# Patient Record
Sex: MALE | Race: WHITE | NOT HISPANIC OR LATINO | URBAN - NONMETROPOLITAN AREA
[De-identification: names, ages, dates, MRNs, and addresses within clinical notes are randomized per-mention and may not be internally consistent; named-entity substitution may affect disease eponyms.]

---

## 2022-05-03 ENCOUNTER — OFFICE VISIT (OUTPATIENT)
Dept: UROLOGY | Facility: CLINIC | Age: 42
End: 2022-05-03

## 2022-05-03 VITALS — WEIGHT: 185 LBS | HEIGHT: 72 IN | TEMPERATURE: 98.3 F | BODY MASS INDEX: 25.06 KG/M2

## 2022-05-03 DIAGNOSIS — Z30.2 ENCOUNTER FOR VASECTOMY: Primary | ICD-10-CM

## 2022-05-03 PROCEDURE — 99203 OFFICE O/P NEW LOW 30 MIN: CPT | Performed by: UROLOGY

## 2022-05-03 RX ORDER — HYDROCODONE BITARTRATE AND ACETAMINOPHEN 5; 325 MG/1; MG/1
1 TABLET ORAL EVERY 8 HOURS PRN
Qty: 9 TABLET | Refills: 0 | Status: SHIPPED | OUTPATIENT
Start: 2022-05-03 | End: 2022-05-06

## 2022-05-03 RX ORDER — ALPRAZOLAM 2 MG/1
2 TABLET ORAL ONCE
Qty: 1 TABLET | Refills: 0 | Status: SHIPPED | OUTPATIENT
Start: 2022-05-03 | End: 2022-05-03

## 2022-05-03 RX ORDER — DEXTROAMPHETAMINE SACCHARATE, AMPHETAMINE ASPARTATE, DEXTROAMPHETAMINE SULFATE AND AMPHETAMINE SULFATE 2.5; 2.5; 2.5; 2.5 MG/1; MG/1; MG/1; MG/1
10 TABLET ORAL DAILY
COMMUNITY
End: 2022-11-23

## 2022-05-03 RX ORDER — ALPRAZOLAM 2 MG/1
2 TABLET ORAL ONCE
Qty: 1 TABLET | Refills: 0 | Status: CANCELLED | OUTPATIENT
Start: 2022-05-03 | End: 2022-05-03

## 2022-06-17 ENCOUNTER — PROCEDURE VISIT (OUTPATIENT)
Dept: UROLOGY | Facility: CLINIC | Age: 42
End: 2022-06-17

## 2022-06-17 DIAGNOSIS — Z30.2 ENCOUNTER FOR VASECTOMY: Primary | ICD-10-CM

## 2022-06-17 PROCEDURE — 55250 REMOVAL OF SPERM DUCT(S): CPT | Performed by: UROLOGY

## 2022-06-17 NOTE — PROGRESS NOTES
CC: I am here to have a vasectomy    Vasectomy procedure note  Patient has been premedicated with alprazolam and Norco.  He has done the appropriate shave the day before the procedure.  He is placed in the supine position.  The usual prep and drape with Betadine is carried out.  The vas is palpated on the right side and  from the remainder of the cord bluntly and pulled just underneath the skin.  1% lidocaine is infiltrated in the subcutaneous tissue.  An incision is made directly onto the vas.  The perivasal tissue was bluntly stripped away from the vas deferens freeing an approximate 2 cm segment.  Titanium clips were placed both proximally and distally and the intervening segment excised.  The specimen,is discarded.  The ends are fulgurated with electrocautery as well as any vessels.    The left-sided vasectomy was carried out the same as the right with no change in findings or technique.  Both wounds are irrigated with sterile saline.  Any subcutaneous vessels that are bleeding are fulgurated.  The skin is closed with a running 3-0 chromic suture.    The patient tolerated this procedure well.  Postoperative instructions were given.  He is reminded that we will need to see  a sample after approximately 20-25 ejaculations to determine whether or not he has had a successful vasectomy.  He is encouraged to use birth control up until that time.    Angel Ceballos MD  6/17/2022  10:46 CDT

## 2022-08-23 ENCOUNTER — APPOINTMENT (OUTPATIENT)
Dept: LAB | Facility: HOSPITAL | Age: 42
End: 2022-08-23

## 2022-09-23 ENCOUNTER — LAB (OUTPATIENT)
Dept: LAB | Facility: HOSPITAL | Age: 42
End: 2022-09-23

## 2022-09-23 DIAGNOSIS — Z30.2 ENCOUNTER FOR VASECTOMY: ICD-10-CM

## 2022-09-23 LAB — SPERM - POST VASECTOMY: NORMAL

## 2022-09-23 PROCEDURE — 89321 SEMEN ANAL SPERM DETECTION: CPT

## 2022-10-03 ENCOUNTER — TELEPHONE (OUTPATIENT)
Dept: UROLOGY | Facility: CLINIC | Age: 42
End: 2022-10-03

## 2022-10-03 NOTE — TELEPHONE ENCOUNTER
Called patient and left a message for him to call back to go over semen analysis. Left call back number. His results were successful and he can drop his back up birthcontrol.

## 2022-10-03 NOTE — TELEPHONE ENCOUNTER
----- Message from Angel Ceballos MD sent at 10/3/2022  7:08 AM CDT -----  No sperm seen on post vasectomy semen analysis at the lab. . Will have staff notify patient that the vasectomy was successful.

## 2022-10-06 ENCOUNTER — TELEPHONE (OUTPATIENT)
Dept: UROLOGY | Facility: CLINIC | Age: 42
End: 2022-10-06

## 2022-10-06 NOTE — TELEPHONE ENCOUNTER
HUB [facility]LE TO WARM TRANSFER TO OFFICE.     PT RETURNING KAYLENE MISSED CALL REGARDING HIS SEMEN ANALYSIS. PH NUMBER [phone]

## 2022-11-23 ENCOUNTER — HOSPITAL ENCOUNTER (INPATIENT)
Facility: HOSPITAL | Age: 42
LOS: 8 days | Discharge: HOME | End: 2022-12-01
Attending: EMERGENCY MEDICINE | Admitting: INTERNAL MEDICINE

## 2022-11-23 ENCOUNTER — APPOINTMENT (OUTPATIENT)
Dept: CT IMAGING | Facility: HOSPITAL | Age: 42
End: 2022-11-23

## 2022-11-23 ENCOUNTER — APPOINTMENT (OUTPATIENT)
Dept: GENERAL RADIOLOGY | Facility: HOSPITAL | Age: 42
End: 2022-11-23

## 2022-11-23 DIAGNOSIS — R65.10 SYSTEMIC INFLAMMATORY RESPONSE SYNDROME: ICD-10-CM

## 2022-11-23 DIAGNOSIS — K52.9 ENTEROCOLITIS: ICD-10-CM

## 2022-11-23 DIAGNOSIS — R10.9 ABDOMINAL PAIN, UNSPECIFIED ABDOMINAL LOCATION: Primary | ICD-10-CM

## 2022-11-23 DIAGNOSIS — R31.9 HEMATURIA, UNSPECIFIED TYPE: ICD-10-CM

## 2022-11-23 DIAGNOSIS — R18.8 OTHER ASCITES: ICD-10-CM

## 2022-11-23 DIAGNOSIS — I96 NECROSIS: ICD-10-CM

## 2022-11-23 DIAGNOSIS — R94.31 ABNORMAL EKG: ICD-10-CM

## 2022-11-23 DIAGNOSIS — E10.10 DIABETIC KETOACIDOSIS WITHOUT COMA ASSOCIATED WITH TYPE 1 DIABETES MELLITUS: ICD-10-CM

## 2022-11-23 DIAGNOSIS — Z78.9 DECREASED ACTIVITIES OF DAILY LIVING (ADL): ICD-10-CM

## 2022-11-23 DIAGNOSIS — K56.609 SMALL BOWEL OBSTRUCTION: ICD-10-CM

## 2022-11-23 DIAGNOSIS — Z74.09 IMPAIRED MOBILITY: ICD-10-CM

## 2022-11-23 DIAGNOSIS — E87.20 LACTIC ACIDOSIS: ICD-10-CM

## 2022-11-23 PROBLEM — I95.9 SEPSIS ASSOCIATED HYPOTENSION: Status: ACTIVE | Noted: 2022-11-23

## 2022-11-23 PROBLEM — R73.9 HYPERGLYCEMIA: Status: ACTIVE | Noted: 2022-11-23

## 2022-11-23 PROBLEM — N17.9 AKI (ACUTE KIDNEY INJURY): Status: ACTIVE | Noted: 2022-11-23

## 2022-11-23 PROBLEM — A41.9 SEPSIS ASSOCIATED HYPOTENSION: Status: ACTIVE | Noted: 2022-11-23

## 2022-11-23 LAB
ABO GROUP BLD: NORMAL
ALBUMIN SERPL-MCNC: 4.8 G/DL (ref 3.5–5.2)
ALBUMIN SERPL-MCNC: 4.8 G/DL (ref 3.5–5.2)
ALBUMIN/GLOB SERPL: 1.8 G/DL
ALBUMIN/GLOB SERPL: 2 G/DL
ALP SERPL-CCNC: 55 U/L (ref 39–117)
ALP SERPL-CCNC: 58 U/L (ref 39–117)
ALT SERPL W P-5'-P-CCNC: 23 U/L (ref 1–41)
ALT SERPL W P-5'-P-CCNC: 24 U/L (ref 1–41)
AMPHET+METHAMPHET UR QL: POSITIVE
AMPHETAMINES UR QL: NEGATIVE
ANION GAP SERPL CALCULATED.3IONS-SCNC: 13 MMOL/L (ref 5–15)
ANION GAP SERPL CALCULATED.3IONS-SCNC: 21 MMOL/L (ref 5–15)
ARTERIAL PATENCY WRIST A: ABNORMAL
AST SERPL-CCNC: 12 U/L (ref 1–40)
AST SERPL-CCNC: 14 U/L (ref 1–40)
ATMOSPHERIC PRESS: 754 MMHG
BACTERIA UR QL AUTO: ABNORMAL /HPF
BARBITURATES UR QL SCN: NEGATIVE
BASE EXCESS BLDA CALC-SCNC: -6.6 MMOL/L (ref 0–2)
BASOPHILS # BLD AUTO: 0.02 10*3/MM3 (ref 0–0.2)
BASOPHILS # BLD AUTO: 0.06 10*3/MM3 (ref 0–0.2)
BASOPHILS NFR BLD AUTO: 0.1 % (ref 0–1.5)
BASOPHILS NFR BLD AUTO: 0.3 % (ref 0–1.5)
BDY SITE: ABNORMAL
BENZODIAZ UR QL SCN: NEGATIVE
BILIRUB SERPL-MCNC: 0.7 MG/DL (ref 0–1.2)
BILIRUB SERPL-MCNC: 0.8 MG/DL (ref 0–1.2)
BILIRUB UR QL STRIP: NEGATIVE
BLD GP AB SCN SERPL QL: NEGATIVE
BODY TEMPERATURE: 37 C
BUN SERPL-MCNC: 30 MG/DL (ref 6–20)
BUN SERPL-MCNC: 34 MG/DL (ref 6–20)
BUN/CREAT SERPL: 23.3 (ref 7–25)
BUN/CREAT SERPL: 36.6 (ref 7–25)
BUPRENORPHINE SERPL-MCNC: NEGATIVE NG/ML
C TRACH RRNA CVX QL NAA+PROBE: NOT DETECTED
CALCIUM SPEC-SCNC: 9.1 MG/DL (ref 8.6–10.5)
CALCIUM SPEC-SCNC: 9.2 MG/DL (ref 8.6–10.5)
CANNABINOIDS SERPL QL: NEGATIVE
CHLORIDE SERPL-SCNC: 102 MMOL/L (ref 98–107)
CHLORIDE SERPL-SCNC: 99 MMOL/L (ref 98–107)
CLARITY UR: CLEAR
CO2 SERPL-SCNC: 24 MMOL/L (ref 22–29)
CO2 SERPL-SCNC: 27 MMOL/L (ref 22–29)
COCAINE UR QL: NEGATIVE
COLOR UR: ABNORMAL
CREAT SERPL-MCNC: 0.82 MG/DL (ref 0.76–1.27)
CREAT SERPL-MCNC: 1.46 MG/DL (ref 0.76–1.27)
CRP SERPL-MCNC: <0.3 MG/DL (ref 0–0.5)
D-LACTATE SERPL-SCNC: 11.3 MMOL/L (ref 0.5–2)
D-LACTATE SERPL-SCNC: 3.1 MMOL/L (ref 0.5–2)
D-LACTATE SERPL-SCNC: 5.3 MMOL/L (ref 0.5–2)
D-LACTATE SERPL-SCNC: 8.2 MMOL/L (ref 0.5–2)
DEPRECATED RDW RBC AUTO: 43.5 FL (ref 37–54)
DEPRECATED RDW RBC AUTO: 46.8 FL (ref 37–54)
DEVELOPER EXPIRATION DATE: ABNORMAL
DEVELOPER LOT NUMBER: 325
EGFRCR SERPLBLD CKD-EPI 2021: 111.8 ML/MIN/1.73
EGFRCR SERPLBLD CKD-EPI 2021: 60.8 ML/MIN/1.73
EOSINOPHIL # BLD AUTO: 0 10*3/MM3 (ref 0–0.4)
EOSINOPHIL # BLD AUTO: 0.3 10*3/MM3 (ref 0–0.4)
EOSINOPHIL NFR BLD AUTO: 0 % (ref 0.3–6.2)
EOSINOPHIL NFR BLD AUTO: 1.3 % (ref 0.3–6.2)
ERYTHROCYTE [DISTWIDTH] IN BLOOD BY AUTOMATED COUNT: 12.7 % (ref 12.3–15.4)
ERYTHROCYTE [DISTWIDTH] IN BLOOD BY AUTOMATED COUNT: 13.6 % (ref 12.3–15.4)
ETHANOL UR QL: <0.01 %
EXPIRATION DATE: ABNORMAL
GAS FLOW AIRWAY: 2 LPM
GASTROCULT GAST QL: POSITIVE
GLOBULIN UR ELPH-MCNC: 2.4 GM/DL
GLOBULIN UR ELPH-MCNC: 2.7 GM/DL
GLUCOSE SERPL-MCNC: 203 MG/DL (ref 65–99)
GLUCOSE SERPL-MCNC: 290 MG/DL (ref 65–99)
GLUCOSE UR STRIP-MCNC: NEGATIVE MG/DL
HCO3 BLDA-SCNC: 18.9 MMOL/L (ref 20–26)
HCT VFR BLD AUTO: 54.6 % (ref 37.5–51)
HCT VFR BLD AUTO: 65.9 % (ref 37.5–51)
HGB BLD-MCNC: 18.1 G/DL (ref 13–17.7)
HGB BLD-MCNC: 21.2 G/DL (ref 13–17.7)
HGB UR QL STRIP.AUTO: ABNORMAL
HOLD SPECIMEN: NORMAL
HYALINE CASTS UR QL AUTO: ABNORMAL /LPF
IMM GRANULOCYTES # BLD AUTO: 0.05 10*3/MM3 (ref 0–0.05)
IMM GRANULOCYTES # BLD AUTO: 0.12 10*3/MM3 (ref 0–0.05)
IMM GRANULOCYTES NFR BLD AUTO: 0.3 % (ref 0–0.5)
IMM GRANULOCYTES NFR BLD AUTO: 0.5 % (ref 0–0.5)
INR PPP: 1.15 (ref 0.91–1.09)
KETONES UR QL STRIP: ABNORMAL
LEUKOCYTE ESTERASE UR QL STRIP.AUTO: NEGATIVE
LIPASE SERPL-CCNC: 28 U/L (ref 13–60)
LYMPHOCYTES # BLD AUTO: 0.51 10*3/MM3 (ref 0.7–3.1)
LYMPHOCYTES # BLD AUTO: 0.59 10*3/MM3 (ref 0.7–3.1)
LYMPHOCYTES NFR BLD AUTO: 2.6 % (ref 19.6–45.3)
LYMPHOCYTES NFR BLD AUTO: 3.4 % (ref 19.6–45.3)
Lab: 325
Lab: ABNORMAL
MCH RBC QN AUTO: 30.8 PG (ref 26.6–33)
MCH RBC QN AUTO: 31.1 PG (ref 26.6–33)
MCHC RBC AUTO-ENTMCNC: 32.2 G/DL (ref 31.5–35.7)
MCHC RBC AUTO-ENTMCNC: 33.2 G/DL (ref 31.5–35.7)
MCV RBC AUTO: 92.9 FL (ref 79–97)
MCV RBC AUTO: 96.8 FL (ref 79–97)
METHADONE UR QL SCN: NEGATIVE
MODALITY: ABNORMAL
MONOCYTES # BLD AUTO: 0.36 10*3/MM3 (ref 0.1–0.9)
MONOCYTES # BLD AUTO: 0.96 10*3/MM3 (ref 0.1–0.9)
MONOCYTES NFR BLD AUTO: 2.4 % (ref 5–12)
MONOCYTES NFR BLD AUTO: 4.2 % (ref 5–12)
N GONORRHOEA RRNA SPEC QL NAA+PROBE: NOT DETECTED
NEGATIVE CONTROL: NEGATIVE
NEUTROPHILS NFR BLD AUTO: 14.13 10*3/MM3 (ref 1.7–7)
NEUTROPHILS NFR BLD AUTO: 20.57 10*3/MM3 (ref 1.7–7)
NEUTROPHILS NFR BLD AUTO: 91.1 % (ref 42.7–76)
NEUTROPHILS NFR BLD AUTO: 93.8 % (ref 42.7–76)
NITRITE UR QL STRIP: NEGATIVE
NRBC BLD AUTO-RTO: 0 /100 WBC (ref 0–0.2)
NRBC BLD AUTO-RTO: 0 /100 WBC (ref 0–0.2)
OPIATES UR QL: NEGATIVE
OXYCODONE UR QL SCN: NEGATIVE
PCO2 BLDA: 37.7 MM HG (ref 35–45)
PCO2 TEMP ADJ BLD: 37.7 MM HG (ref 35–45)
PCP UR QL SCN: NEGATIVE
PH BLDA: 7.31 PH UNITS (ref 7.35–7.45)
PH UR STRIP.AUTO: 5.5 [PH] (ref 5–8)
PH, TEMP CORRECTED: 7.31 PH UNITS (ref 7.35–7.45)
PLATELET # BLD AUTO: 357 10*3/MM3 (ref 140–450)
PLATELET # BLD AUTO: 399 10*3/MM3 (ref 140–450)
PMV BLD AUTO: 9.1 FL (ref 6–12)
PMV BLD AUTO: 9.2 FL (ref 6–12)
PO2 BLDA: 97.3 MM HG (ref 83–108)
PO2 TEMP ADJ BLD: 97.3 MM HG (ref 83–108)
POSITIVE CONTROL: POSITIVE
POTASSIUM SERPL-SCNC: 4 MMOL/L (ref 3.5–5.2)
POTASSIUM SERPL-SCNC: 4.1 MMOL/L (ref 3.5–5.2)
PROCALCITONIN SERPL-MCNC: 0.02 NG/ML (ref 0–0.25)
PROPOXYPH UR QL: NEGATIVE
PROT SERPL-MCNC: 7.2 G/DL (ref 6–8.5)
PROT SERPL-MCNC: 7.5 G/DL (ref 6–8.5)
PROT UR QL STRIP: ABNORMAL
PROTHROMBIN TIME: 14.2 SECONDS (ref 11.9–14.6)
RBC # BLD AUTO: 5.88 10*6/MM3 (ref 4.14–5.8)
RBC # BLD AUTO: 6.81 10*6/MM3 (ref 4.14–5.8)
RBC # UR STRIP: ABNORMAL /HPF
REF LAB TEST METHOD: ABNORMAL
RH BLD: POSITIVE
SAO2 % BLDCOA: 97.8 % (ref 94–99)
SODIUM SERPL-SCNC: 142 MMOL/L (ref 136–145)
SODIUM SERPL-SCNC: 144 MMOL/L (ref 136–145)
SP GR UR STRIP: >1.03 (ref 1–1.03)
SQUAMOUS #/AREA URNS HPF: ABNORMAL /HPF
T&S EXPIRATION DATE: NORMAL
TRICYCLICS UR QL SCN: NEGATIVE
UROBILINOGEN UR QL STRIP: ABNORMAL
VENTILATOR MODE: ABNORMAL
WBC # UR STRIP: ABNORMAL /HPF
WBC NRBC COR # BLD: 15.07 10*3/MM3 (ref 3.4–10.8)
WBC NRBC COR # BLD: 22.6 10*3/MM3 (ref 3.4–10.8)

## 2022-11-23 PROCEDURE — 84145 PROCALCITONIN (PCT): CPT | Performed by: EMERGENCY MEDICINE

## 2022-11-23 PROCEDURE — 36415 COLL VENOUS BLD VENIPUNCTURE: CPT | Performed by: EMERGENCY MEDICINE

## 2022-11-23 PROCEDURE — 87040 BLOOD CULTURE FOR BACTERIA: CPT | Performed by: EMERGENCY MEDICINE

## 2022-11-23 PROCEDURE — 74176 CT ABD & PELVIS W/O CONTRAST: CPT

## 2022-11-23 PROCEDURE — 87591 N.GONORRHOEAE DNA AMP PROB: CPT | Performed by: EMERGENCY MEDICINE

## 2022-11-23 PROCEDURE — 99285 EMERGENCY DEPT VISIT HI MDM: CPT

## 2022-11-23 PROCEDURE — 25010000002 PIPERACILLIN SOD-TAZOBACTAM PER 1 G: Performed by: EMERGENCY MEDICINE

## 2022-11-23 PROCEDURE — 82803 BLOOD GASES ANY COMBINATION: CPT

## 2022-11-23 PROCEDURE — 99222 1ST HOSP IP/OBS MODERATE 55: CPT | Performed by: SPECIALIST

## 2022-11-23 PROCEDURE — 83690 ASSAY OF LIPASE: CPT | Performed by: EMERGENCY MEDICINE

## 2022-11-23 PROCEDURE — 25010000002 ENOXAPARIN PER 10 MG: Performed by: SPECIALIST

## 2022-11-23 PROCEDURE — 0 HYDROMORPHONE 1 MG/ML SOLUTION: Performed by: EMERGENCY MEDICINE

## 2022-11-23 PROCEDURE — 25010000002 ONDANSETRON PER 1 MG: Performed by: EMERGENCY MEDICINE

## 2022-11-23 PROCEDURE — 25010000002 PIPERACILLIN SOD-TAZOBACTAM PER 1 G: Performed by: NURSE PRACTITIONER

## 2022-11-23 PROCEDURE — 80053 COMPREHEN METABOLIC PANEL: CPT | Performed by: EMERGENCY MEDICINE

## 2022-11-23 PROCEDURE — 71045 X-RAY EXAM CHEST 1 VIEW: CPT

## 2022-11-23 PROCEDURE — 86900 BLOOD TYPING SEROLOGIC ABO: CPT | Performed by: EMERGENCY MEDICINE

## 2022-11-23 PROCEDURE — 25010000002 KETOROLAC TROMETHAMINE PER 15 MG: Performed by: EMERGENCY MEDICINE

## 2022-11-23 PROCEDURE — 25010000002 HYDROMORPHONE PER 4 MG: Performed by: EMERGENCY MEDICINE

## 2022-11-23 PROCEDURE — 36415 COLL VENOUS BLD VENIPUNCTURE: CPT

## 2022-11-23 PROCEDURE — 25010000002 HYDROMORPHONE PER 4 MG: Performed by: NURSE PRACTITIONER

## 2022-11-23 PROCEDURE — 80306 DRUG TEST PRSMV INSTRMNT: CPT | Performed by: FAMILY MEDICINE

## 2022-11-23 PROCEDURE — 93005 ELECTROCARDIOGRAM TRACING: CPT | Performed by: EMERGENCY MEDICINE

## 2022-11-23 PROCEDURE — 86850 RBC ANTIBODY SCREEN: CPT | Performed by: EMERGENCY MEDICINE

## 2022-11-23 PROCEDURE — 93010 ELECTROCARDIOGRAM REPORT: CPT | Performed by: INTERNAL MEDICINE

## 2022-11-23 PROCEDURE — 85610 PROTHROMBIN TIME: CPT | Performed by: EMERGENCY MEDICINE

## 2022-11-23 PROCEDURE — 83605 ASSAY OF LACTIC ACID: CPT | Performed by: EMERGENCY MEDICINE

## 2022-11-23 PROCEDURE — 86140 C-REACTIVE PROTEIN: CPT | Performed by: EMERGENCY MEDICINE

## 2022-11-23 PROCEDURE — 3E03329 INTRODUCTION OF OTHER ANTI-INFECTIVE INTO PERIPHERAL VEIN, PERCUTANEOUS APPROACH: ICD-10-PCS | Performed by: EMERGENCY MEDICINE

## 2022-11-23 PROCEDURE — 99221 1ST HOSP IP/OBS SF/LOW 40: CPT | Performed by: SURGERY

## 2022-11-23 PROCEDURE — 74174 CTA ABD&PLVS W/CONTRAST: CPT

## 2022-11-23 PROCEDURE — 87491 CHLMYD TRACH DNA AMP PROBE: CPT | Performed by: EMERGENCY MEDICINE

## 2022-11-23 PROCEDURE — 0 IOPAMIDOL PER 1 ML: Performed by: EMERGENCY MEDICINE

## 2022-11-23 PROCEDURE — 86901 BLOOD TYPING SEROLOGIC RH(D): CPT | Performed by: EMERGENCY MEDICINE

## 2022-11-23 PROCEDURE — 81001 URINALYSIS AUTO W/SCOPE: CPT | Performed by: EMERGENCY MEDICINE

## 2022-11-23 PROCEDURE — 82077 ASSAY SPEC XCP UR&BREATH IA: CPT | Performed by: FAMILY MEDICINE

## 2022-11-23 PROCEDURE — 85025 COMPLETE CBC W/AUTO DIFF WBC: CPT | Performed by: EMERGENCY MEDICINE

## 2022-11-23 PROCEDURE — 36600 WITHDRAWAL OF ARTERIAL BLOOD: CPT

## 2022-11-23 RX ORDER — ONDANSETRON 2 MG/ML
4 INJECTION INTRAMUSCULAR; INTRAVENOUS ONCE
Status: COMPLETED | OUTPATIENT
Start: 2022-11-23 | End: 2022-11-23

## 2022-11-23 RX ORDER — HYDROMORPHONE HYDROCHLORIDE 1 MG/ML
0.5 INJECTION, SOLUTION INTRAMUSCULAR; INTRAVENOUS; SUBCUTANEOUS
Status: DISCONTINUED | OUTPATIENT
Start: 2022-11-23 | End: 2022-11-28

## 2022-11-23 RX ORDER — METRONIDAZOLE 500 MG/100ML
500 INJECTION, SOLUTION INTRAVENOUS EVERY 8 HOURS
Status: DISCONTINUED | OUTPATIENT
Start: 2022-11-23 | End: 2022-11-23

## 2022-11-23 RX ORDER — SODIUM CHLORIDE 0.9 % (FLUSH) 0.9 %
10 SYRINGE (ML) INJECTION AS NEEDED
Status: DISCONTINUED | OUTPATIENT
Start: 2022-11-23 | End: 2022-12-01 | Stop reason: HOSPADM

## 2022-11-23 RX ORDER — ONDANSETRON 4 MG/1
4 TABLET, FILM COATED ORAL EVERY 6 HOURS PRN
Status: DISCONTINUED | OUTPATIENT
Start: 2022-11-23 | End: 2022-12-01 | Stop reason: HOSPADM

## 2022-11-23 RX ORDER — SUCRALFATE 1 G/1
1 TABLET ORAL
Status: DISCONTINUED | OUTPATIENT
Start: 2022-11-23 | End: 2022-12-01 | Stop reason: HOSPADM

## 2022-11-23 RX ORDER — FAMOTIDINE 10 MG/ML
20 INJECTION, SOLUTION INTRAVENOUS EVERY 12 HOURS SCHEDULED
Status: DISCONTINUED | OUTPATIENT
Start: 2022-11-23 | End: 2022-11-23 | Stop reason: ALTCHOICE

## 2022-11-23 RX ORDER — NALOXONE HCL 0.4 MG/ML
0.4 VIAL (ML) INJECTION
Status: DISCONTINUED | OUTPATIENT
Start: 2022-11-23 | End: 2022-12-01 | Stop reason: HOSPADM

## 2022-11-23 RX ORDER — SODIUM CHLORIDE 0.9 % (FLUSH) 0.9 %
10 SYRINGE (ML) INJECTION EVERY 12 HOURS SCHEDULED
Status: DISCONTINUED | OUTPATIENT
Start: 2022-11-23 | End: 2022-12-01 | Stop reason: HOSPADM

## 2022-11-23 RX ORDER — DEXTROAMPHETAMINE SACCHARATE, AMPHETAMINE ASPARTATE, DEXTROAMPHETAMINE SULFATE AND AMPHETAMINE SULFATE 5; 5; 5; 5 MG/1; MG/1; MG/1; MG/1
20 TABLET ORAL DAILY
COMMUNITY

## 2022-11-23 RX ORDER — SODIUM CHLORIDE 0.9 % (FLUSH) 0.9 %
1-10 SYRINGE (ML) INJECTION AS NEEDED
Status: DISCONTINUED | OUTPATIENT
Start: 2022-11-23 | End: 2022-11-24

## 2022-11-23 RX ORDER — ACETAMINOPHEN 650 MG/1
650 SUPPOSITORY RECTAL EVERY 4 HOURS PRN
Status: DISCONTINUED | OUTPATIENT
Start: 2022-11-23 | End: 2022-12-01 | Stop reason: HOSPADM

## 2022-11-23 RX ORDER — ONDANSETRON 2 MG/ML
4 INJECTION INTRAMUSCULAR; INTRAVENOUS EVERY 6 HOURS PRN
Status: DISCONTINUED | OUTPATIENT
Start: 2022-11-23 | End: 2022-12-01 | Stop reason: HOSPADM

## 2022-11-23 RX ORDER — SODIUM CHLORIDE, SODIUM LACTATE, POTASSIUM CHLORIDE, CALCIUM CHLORIDE 600; 310; 30; 20 MG/100ML; MG/100ML; MG/100ML; MG/100ML
150 INJECTION, SOLUTION INTRAVENOUS CONTINUOUS
Status: DISCONTINUED | OUTPATIENT
Start: 2022-11-23 | End: 2022-11-23

## 2022-11-23 RX ORDER — ACETAMINOPHEN 160 MG/5ML
650 SOLUTION ORAL EVERY 4 HOURS PRN
Status: DISCONTINUED | OUTPATIENT
Start: 2022-11-23 | End: 2022-12-01 | Stop reason: HOSPADM

## 2022-11-23 RX ORDER — ACETAMINOPHEN 325 MG/1
650 TABLET ORAL EVERY 4 HOURS PRN
Status: DISCONTINUED | OUTPATIENT
Start: 2022-11-23 | End: 2022-12-01 | Stop reason: HOSPADM

## 2022-11-23 RX ORDER — KETOROLAC TROMETHAMINE 30 MG/ML
30 INJECTION, SOLUTION INTRAMUSCULAR; INTRAVENOUS ONCE
Status: COMPLETED | OUTPATIENT
Start: 2022-11-23 | End: 2022-11-23

## 2022-11-23 RX ORDER — METRONIDAZOLE 500 MG/100ML
500 INJECTION, SOLUTION INTRAVENOUS EVERY 6 HOURS
Status: COMPLETED | OUTPATIENT
Start: 2022-11-23 | End: 2022-11-26

## 2022-11-23 RX ORDER — ENOXAPARIN SODIUM 100 MG/ML
30 INJECTION SUBCUTANEOUS EVERY 12 HOURS SCHEDULED
Status: DISCONTINUED | OUTPATIENT
Start: 2022-11-23 | End: 2022-11-24

## 2022-11-23 RX ORDER — SODIUM CHLORIDE 0.9 % (FLUSH) 0.9 %
10 SYRINGE (ML) INJECTION EVERY 12 HOURS SCHEDULED
Status: DISCONTINUED | OUTPATIENT
Start: 2022-11-23 | End: 2022-11-24

## 2022-11-23 RX ORDER — SODIUM CHLORIDE 0.9 % (FLUSH) 0.9 %
10 SYRINGE (ML) INJECTION AS NEEDED
Status: DISCONTINUED | OUTPATIENT
Start: 2022-11-23 | End: 2022-11-29

## 2022-11-23 RX ORDER — SODIUM CHLORIDE 9 MG/ML
40 INJECTION, SOLUTION INTRAVENOUS AS NEEDED
Status: DISCONTINUED | OUTPATIENT
Start: 2022-11-23 | End: 2022-11-24

## 2022-11-23 RX ORDER — SODIUM CHLORIDE 9 MG/ML
40 INJECTION, SOLUTION INTRAVENOUS AS NEEDED
Status: DISCONTINUED | OUTPATIENT
Start: 2022-11-23 | End: 2022-12-01 | Stop reason: HOSPADM

## 2022-11-23 RX ORDER — ANASTROZOLE 1 MG/1
TABLET ORAL DAILY
COMMUNITY

## 2022-11-23 RX ORDER — DEXTROSE, SODIUM CHLORIDE, AND POTASSIUM CHLORIDE 5; .45; .15 G/100ML; G/100ML; G/100ML
125 INJECTION INTRAVENOUS CONTINUOUS
Status: DISCONTINUED | OUTPATIENT
Start: 2022-11-23 | End: 2022-11-24

## 2022-11-23 RX ORDER — HYDROMORPHONE HYDROCHLORIDE 1 MG/ML
0.5 INJECTION, SOLUTION INTRAMUSCULAR; INTRAVENOUS; SUBCUTANEOUS ONCE
Status: COMPLETED | OUTPATIENT
Start: 2022-11-23 | End: 2022-11-23

## 2022-11-23 RX ADMIN — TAZOBACTAM SODIUM AND PIPERACILLIN SODIUM 3.38 G: 375; 3 INJECTION, SOLUTION INTRAVENOUS at 21:20

## 2022-11-23 RX ADMIN — METRONIDAZOLE 500 MG: 500 INJECTION, SOLUTION INTRAVENOUS at 22:09

## 2022-11-23 RX ADMIN — HYDROMORPHONE HYDROCHLORIDE 1 MG: 1 INJECTION, SOLUTION INTRAMUSCULAR; INTRAVENOUS; SUBCUTANEOUS at 12:04

## 2022-11-23 RX ADMIN — IOPAMIDOL 100 ML: 755 INJECTION, SOLUTION INTRAVENOUS at 12:31

## 2022-11-23 RX ADMIN — PIPERACILLIN AND TAZOBACTAM 4.5 G: 4; .5 INJECTION, POWDER, LYOPHILIZED, FOR SOLUTION INTRAVENOUS; PARENTERAL at 15:24

## 2022-11-23 RX ADMIN — Medication 10 ML: at 20:28

## 2022-11-23 RX ADMIN — SODIUM CHLORIDE, POTASSIUM CHLORIDE, SODIUM LACTATE AND CALCIUM CHLORIDE 1000 ML: 600; 310; 30; 20 INJECTION, SOLUTION INTRAVENOUS at 18:32

## 2022-11-23 RX ADMIN — ENOXAPARIN SODIUM 30 MG: 100 INJECTION SUBCUTANEOUS at 21:15

## 2022-11-23 RX ADMIN — SODIUM CHLORIDE, POTASSIUM CHLORIDE, SODIUM LACTATE AND CALCIUM CHLORIDE 1000 ML: 600; 310; 30; 20 INJECTION, SOLUTION INTRAVENOUS at 20:25

## 2022-11-23 RX ADMIN — KETOROLAC TROMETHAMINE 30 MG: 30 INJECTION, SOLUTION INTRAMUSCULAR; INTRAVENOUS at 08:35

## 2022-11-23 RX ADMIN — POTASSIUM CHLORIDE, DEXTROSE MONOHYDRATE AND SODIUM CHLORIDE 125 ML/HR: 150; 5; 450 INJECTION, SOLUTION INTRAVENOUS at 18:32

## 2022-11-23 RX ADMIN — METRONIDAZOLE 500 MG: 500 INJECTION, SOLUTION INTRAVENOUS at 16:50

## 2022-11-23 RX ADMIN — Medication 10 ML: at 20:20

## 2022-11-23 RX ADMIN — PANTOPRAZOLE SODIUM 8 MG/HR: 40 INJECTION, POWDER, FOR SOLUTION INTRAVENOUS at 20:17

## 2022-11-23 RX ADMIN — HYDROMORPHONE HYDROCHLORIDE 0.5 MG: 1 INJECTION, SOLUTION INTRAMUSCULAR; INTRAVENOUS; SUBCUTANEOUS at 08:36

## 2022-11-23 RX ADMIN — SODIUM CHLORIDE, POTASSIUM CHLORIDE, SODIUM LACTATE AND CALCIUM CHLORIDE 1000 ML: 600; 310; 30; 20 INJECTION, SOLUTION INTRAVENOUS at 20:17

## 2022-11-23 RX ADMIN — HYDROMORPHONE HYDROCHLORIDE 0.5 MG: 1 INJECTION, SOLUTION INTRAMUSCULAR; INTRAVENOUS; SUBCUTANEOUS at 21:32

## 2022-11-23 RX ADMIN — SODIUM CHLORIDE, POTASSIUM CHLORIDE, SODIUM LACTATE AND CALCIUM CHLORIDE 1000 ML: 600; 310; 30; 20 INJECTION, SOLUTION INTRAVENOUS at 15:21

## 2022-11-23 RX ADMIN — ONDANSETRON 4 MG: 2 INJECTION INTRAMUSCULAR; INTRAVENOUS at 08:35

## 2022-11-23 RX ADMIN — SODIUM CHLORIDE 1000 ML: 9 INJECTION, SOLUTION INTRAVENOUS at 08:36

## 2022-11-24 ENCOUNTER — APPOINTMENT (OUTPATIENT)
Dept: ULTRASOUND IMAGING | Facility: HOSPITAL | Age: 42
End: 2022-11-24

## 2022-11-24 ENCOUNTER — ANESTHESIA EVENT (OUTPATIENT)
Dept: PERIOP | Facility: HOSPITAL | Age: 42
End: 2022-11-24

## 2022-11-24 ENCOUNTER — APPOINTMENT (OUTPATIENT)
Dept: GENERAL RADIOLOGY | Facility: HOSPITAL | Age: 42
End: 2022-11-24

## 2022-11-24 ENCOUNTER — ANESTHESIA (OUTPATIENT)
Dept: PERIOP | Facility: HOSPITAL | Age: 42
End: 2022-11-24

## 2022-11-24 LAB
ALBUMIN SERPL-MCNC: 3.7 G/DL (ref 3.5–5.2)
ALBUMIN/GLOB SERPL: 1.6 G/DL
ALP SERPL-CCNC: 36 U/L (ref 39–117)
ALT SERPL W P-5'-P-CCNC: 16 U/L (ref 1–41)
AMYLASE SERPL-CCNC: 148 U/L (ref 28–100)
ANION GAP SERPL CALCULATED.3IONS-SCNC: 12 MMOL/L (ref 5–15)
ANION GAP SERPL CALCULATED.3IONS-SCNC: 14 MMOL/L (ref 5–15)
ARTERIAL PATENCY WRIST A: POSITIVE
AST SERPL-CCNC: 13 U/L (ref 1–40)
ATMOSPHERIC PRESS: 753 MMHG
BASE EXCESS BLDA CALC-SCNC: -2.4 MMOL/L (ref 0–2)
BASOPHILS # BLD AUTO: 0.04 10*3/MM3 (ref 0–0.2)
BASOPHILS NFR BLD AUTO: 0.1 % (ref 0–1.5)
BDY SITE: ABNORMAL
BILIRUB SERPL-MCNC: 0.7 MG/DL (ref 0–1.2)
BODY TEMPERATURE: 37 C
BUN SERPL-MCNC: 59 MG/DL (ref 6–20)
BUN SERPL-MCNC: 61 MG/DL (ref 6–20)
BUN/CREAT SERPL: 13.9 (ref 7–25)
BUN/CREAT SERPL: 15 (ref 7–25)
C3 SERPL-MCNC: 73 MG/DL (ref 82–167)
C4 SERPL-MCNC: 18 MG/DL (ref 14–44)
CALCIUM SPEC-SCNC: 8.1 MG/DL (ref 8.6–10.5)
CALCIUM SPEC-SCNC: 8.4 MG/DL (ref 8.6–10.5)
CHLORIDE SERPL-SCNC: 102 MMOL/L (ref 98–107)
CHLORIDE SERPL-SCNC: 105 MMOL/L (ref 98–107)
CHOLEST SERPL-MCNC: 93 MG/DL (ref 0–200)
CO2 SERPL-SCNC: 23 MMOL/L (ref 22–29)
CO2 SERPL-SCNC: 24 MMOL/L (ref 22–29)
CREAT SERPL-MCNC: 4.08 MG/DL (ref 0.76–1.27)
CREAT SERPL-MCNC: 4.24 MG/DL (ref 0.76–1.27)
D-LACTATE SERPL-SCNC: 2.4 MMOL/L (ref 0.5–2)
D-LACTATE SERPL-SCNC: 3.6 MMOL/L (ref 0.5–2)
D-LACTATE SERPL-SCNC: 3.8 MMOL/L (ref 0.5–2)
DEPRECATED RDW RBC AUTO: 46.5 FL (ref 37–54)
EGFRCR SERPLBLD CKD-EPI 2021: 16.9 ML/MIN/1.73
EGFRCR SERPLBLD CKD-EPI 2021: 17.7 ML/MIN/1.73
EOSINOPHIL # BLD AUTO: 0 10*3/MM3 (ref 0–0.4)
EOSINOPHIL NFR BLD AUTO: 0 % (ref 0.3–6.2)
ERYTHROCYTE [DISTWIDTH] IN BLOOD BY AUTOMATED COUNT: 13.2 % (ref 12.3–15.4)
GLOBULIN UR ELPH-MCNC: 2.3 GM/DL
GLUCOSE SERPL-MCNC: 104 MG/DL (ref 65–99)
GLUCOSE SERPL-MCNC: 159 MG/DL (ref 65–99)
HBA1C MFR BLD: 5.2 % (ref 4.8–5.6)
HCO3 BLDA-SCNC: 23 MMOL/L (ref 20–26)
HCT VFR BLD AUTO: 44.6 % (ref 37.5–51)
HDLC SERPL-MCNC: 45 MG/DL (ref 40–60)
HGB BLD-MCNC: 14.5 G/DL (ref 13–17.7)
IMM GRANULOCYTES # BLD AUTO: 0.17 10*3/MM3 (ref 0–0.05)
IMM GRANULOCYTES NFR BLD AUTO: 0.6 % (ref 0–0.5)
INHALED O2 CONCENTRATION: 50 %
INR PPP: 1.25 (ref 0.91–1.09)
LDLC SERPL CALC-MCNC: 36 MG/DL (ref 0–100)
LDLC/HDLC SERPL: 0.88 {RATIO}
LIPASE SERPL-CCNC: 70 U/L (ref 13–60)
LYMPHOCYTES # BLD AUTO: 1.39 10*3/MM3 (ref 0.7–3.1)
LYMPHOCYTES NFR BLD AUTO: 5.2 % (ref 19.6–45.3)
Lab: ABNORMAL
MCH RBC QN AUTO: 30.9 PG (ref 26.6–33)
MCHC RBC AUTO-ENTMCNC: 32.5 G/DL (ref 31.5–35.7)
MCV RBC AUTO: 94.9 FL (ref 79–97)
MODALITY: ABNORMAL
MONOCYTES # BLD AUTO: 2.37 10*3/MM3 (ref 0.1–0.9)
MONOCYTES NFR BLD AUTO: 8.9 % (ref 5–12)
NEUTROPHILS NFR BLD AUTO: 22.74 10*3/MM3 (ref 1.7–7)
NEUTROPHILS NFR BLD AUTO: 85.2 % (ref 42.7–76)
NRBC BLD AUTO-RTO: 0 /100 WBC (ref 0–0.2)
PCO2 BLDA: 41.5 MM HG (ref 35–45)
PCO2 TEMP ADJ BLD: 41.5 MM HG (ref 35–45)
PEEP RESPIRATORY: 5 CM[H2O]
PH BLDA: 7.35 PH UNITS (ref 7.35–7.45)
PH, TEMP CORRECTED: 7.35 PH UNITS (ref 7.35–7.45)
PLATELET # BLD AUTO: 313 10*3/MM3 (ref 140–450)
PMV BLD AUTO: 9.7 FL (ref 6–12)
PO2 BLDA: 206 MM HG (ref 83–108)
PO2 TEMP ADJ BLD: 206 MM HG (ref 83–108)
POTASSIUM SERPL-SCNC: 5.7 MMOL/L (ref 3.5–5.2)
POTASSIUM SERPL-SCNC: 6.3 MMOL/L (ref 3.5–5.2)
PROT SERPL-MCNC: 6 G/DL (ref 6–8.5)
PROTHROMBIN TIME: 15.2 SECONDS (ref 11.9–14.6)
RBC # BLD AUTO: 4.7 10*6/MM3 (ref 4.14–5.8)
SAO2 % BLDCOA: >100.1 % (ref 94–99)
SET MECH RESP RATE: 18
SODIUM SERPL-SCNC: 139 MMOL/L (ref 136–145)
SODIUM SERPL-SCNC: 141 MMOL/L (ref 136–145)
TRIGL SERPL-MCNC: 43 MG/DL (ref 0–150)
VENTILATOR MODE: AC
VLDLC SERPL-MCNC: 12 MG/DL (ref 5–40)
VT ON VENT VENT: 550 ML
WBC NRBC COR # BLD: 26.71 10*3/MM3 (ref 3.4–10.8)

## 2022-11-24 PROCEDURE — 44120 REMOVAL OF SMALL INTESTINE: CPT | Performed by: SPECIALIST

## 2022-11-24 PROCEDURE — 25010000002 ENOXAPARIN PER 10 MG: Performed by: FAMILY MEDICINE

## 2022-11-24 PROCEDURE — 25010000002 MIDAZOLAM PER 1 MG: Performed by: FAMILY MEDICINE

## 2022-11-24 PROCEDURE — 5A1945Z RESPIRATORY VENTILATION, 24-96 CONSECUTIVE HOURS: ICD-10-PCS | Performed by: INTERNAL MEDICINE

## 2022-11-24 PROCEDURE — 25010000002 ENOXAPARIN PER 10 MG: Performed by: SPECIALIST

## 2022-11-24 PROCEDURE — 82150 ASSAY OF AMYLASE: CPT | Performed by: SPECIALIST

## 2022-11-24 PROCEDURE — 25010000002 ONDANSETRON PER 1 MG: Performed by: SPECIALIST

## 2022-11-24 PROCEDURE — 83036 HEMOGLOBIN GLYCOSYLATED A1C: CPT | Performed by: NURSE PRACTITIONER

## 2022-11-24 PROCEDURE — 86160 COMPLEMENT ANTIGEN: CPT | Performed by: INTERNAL MEDICINE

## 2022-11-24 PROCEDURE — 88307 TISSUE EXAM BY PATHOLOGIST: CPT | Performed by: SPECIALIST

## 2022-11-24 PROCEDURE — 25010000002 PIPERACILLIN SOD-TAZOBACTAM PER 1 G: Performed by: NURSE PRACTITIONER

## 2022-11-24 PROCEDURE — 85610 PROTHROMBIN TIME: CPT | Performed by: SPECIALIST

## 2022-11-24 PROCEDURE — 83516 IMMUNOASSAY NONANTIBODY: CPT | Performed by: INTERNAL MEDICINE

## 2022-11-24 PROCEDURE — 83690 ASSAY OF LIPASE: CPT | Performed by: SPECIALIST

## 2022-11-24 PROCEDURE — 86235 NUCLEAR ANTIGEN ANTIBODY: CPT | Performed by: INTERNAL MEDICINE

## 2022-11-24 PROCEDURE — 86037 ANCA TITER EACH ANTIBODY: CPT | Performed by: INTERNAL MEDICINE

## 2022-11-24 PROCEDURE — 25010000002 CALCIUM GLUCONATE PER 10 ML: Performed by: FAMILY MEDICINE

## 2022-11-24 PROCEDURE — 80061 LIPID PANEL: CPT | Performed by: NURSE PRACTITIONER

## 2022-11-24 PROCEDURE — 25010000002 ONDANSETRON PER 1 MG: Performed by: NURSE PRACTITIONER

## 2022-11-24 PROCEDURE — 63710000001 INSULIN REGULAR HUMAN PER 5 UNITS: Performed by: FAMILY MEDICINE

## 2022-11-24 PROCEDURE — 25010000002 HYDROMORPHONE PER 4 MG: Performed by: NURSE PRACTITIONER

## 2022-11-24 PROCEDURE — 99232 SBSQ HOSP IP/OBS MODERATE 35: CPT | Performed by: SURGERY

## 2022-11-24 PROCEDURE — 25010000002 FUROSEMIDE PER 20 MG: Performed by: FAMILY MEDICINE

## 2022-11-24 PROCEDURE — 25010000002 PROPOFOL 10 MG/ML EMULSION: Performed by: NURSE ANESTHETIST, CERTIFIED REGISTERED

## 2022-11-24 PROCEDURE — 82803 BLOOD GASES ANY COMBINATION: CPT

## 2022-11-24 PROCEDURE — 94799 UNLISTED PULMONARY SVC/PX: CPT

## 2022-11-24 PROCEDURE — 80053 COMPREHEN METABOLIC PANEL: CPT | Performed by: FAMILY MEDICINE

## 2022-11-24 PROCEDURE — 36600 WITHDRAWAL OF ARTERIAL BLOOD: CPT

## 2022-11-24 PROCEDURE — 71045 X-RAY EXAM CHEST 1 VIEW: CPT

## 2022-11-24 PROCEDURE — 76775 US EXAM ABDO BACK WALL LIM: CPT

## 2022-11-24 PROCEDURE — 0DB80ZZ EXCISION OF SMALL INTESTINE, OPEN APPROACH: ICD-10-PCS | Performed by: SPECIALIST

## 2022-11-24 PROCEDURE — 83605 ASSAY OF LACTIC ACID: CPT | Performed by: EMERGENCY MEDICINE

## 2022-11-24 PROCEDURE — 94002 VENT MGMT INPAT INIT DAY: CPT

## 2022-11-24 PROCEDURE — 76705 ECHO EXAM OF ABDOMEN: CPT

## 2022-11-24 PROCEDURE — 99024 POSTOP FOLLOW-UP VISIT: CPT | Performed by: SPECIALIST

## 2022-11-24 PROCEDURE — 25010000002 CEFEPIME PER 500 MG: Performed by: SPECIALIST

## 2022-11-24 PROCEDURE — 84165 PROTEIN E-PHORESIS SERUM: CPT | Performed by: INTERNAL MEDICINE

## 2022-11-24 PROCEDURE — 25010000002 FENTANYL CITRATE (PF) 100 MCG/2ML SOLUTION: Performed by: NURSE ANESTHETIST, CERTIFIED REGISTERED

## 2022-11-24 PROCEDURE — 86225 DNA ANTIBODY NATIVE: CPT | Performed by: INTERNAL MEDICINE

## 2022-11-24 PROCEDURE — 25010000002 PHENYLEPHRINE 10 MG/ML SOLUTION 1 ML VIAL: Performed by: NURSE ANESTHETIST, CERTIFIED REGISTERED

## 2022-11-24 PROCEDURE — 25010000002 MORPHINE SULFATE 10 MG/ML SOLUTION: Performed by: FAMILY MEDICINE

## 2022-11-24 PROCEDURE — 85025 COMPLETE CBC W/AUTO DIFF WBC: CPT | Performed by: NURSE PRACTITIONER

## 2022-11-24 PROCEDURE — 25010000002 ALBUMIN HUMAN 5% PER 50 ML: Performed by: NURSE ANESTHETIST, CERTIFIED REGISTERED

## 2022-11-24 PROCEDURE — P9041 ALBUMIN (HUMAN),5%, 50ML: HCPCS | Performed by: NURSE ANESTHETIST, CERTIFIED REGISTERED

## 2022-11-24 DEVICE — PROXIMATE LINEAR CUTTER RELOAD, BLUE, 75MM
Type: IMPLANTABLE DEVICE | Site: ABDOMEN | Status: FUNCTIONAL
Brand: PROXIMATE

## 2022-11-24 DEVICE — PROXIMATE RELOADABLE LINEAR CUTTER WITH SAFETY LOCK-OUT, 75MM
Type: IMPLANTABLE DEVICE | Site: ABDOMEN | Status: FUNCTIONAL
Brand: PROXIMATE

## 2022-11-24 RX ORDER — DEXMEDETOMIDINE HYDROCHLORIDE 4 UG/ML
INJECTION, SOLUTION INTRAVENOUS CONTINUOUS PRN
Status: DISCONTINUED | OUTPATIENT
Start: 2022-11-24 | End: 2022-11-24 | Stop reason: SURG

## 2022-11-24 RX ORDER — DEXTROSE MONOHYDRATE 25 G/50ML
INJECTION, SOLUTION INTRAVENOUS
Status: COMPLETED
Start: 2022-11-24 | End: 2022-11-24

## 2022-11-24 RX ORDER — ENOXAPARIN SODIUM 100 MG/ML
30 INJECTION SUBCUTANEOUS EVERY 12 HOURS
Status: DISCONTINUED | OUTPATIENT
Start: 2022-11-25 | End: 2022-12-01 | Stop reason: HOSPADM

## 2022-11-24 RX ORDER — MORPHINE SULFATE 1 MG/ML
2 INJECTION, SOLUTION INTRAVENOUS CONTINUOUS
Status: DISCONTINUED | OUTPATIENT
Start: 2022-11-24 | End: 2022-11-26

## 2022-11-24 RX ORDER — DEXTROSE MONOHYDRATE 25 G/50ML
50 INJECTION, SOLUTION INTRAVENOUS
Status: DISCONTINUED | OUTPATIENT
Start: 2022-11-24 | End: 2022-11-24

## 2022-11-24 RX ORDER — LIDOCAINE HYDROCHLORIDE 20 MG/ML
INJECTION, SOLUTION EPIDURAL; INFILTRATION; INTRACAUDAL; PERINEURAL AS NEEDED
Status: DISCONTINUED | OUTPATIENT
Start: 2022-11-24 | End: 2022-11-24 | Stop reason: SURG

## 2022-11-24 RX ORDER — MIDAZOLAM HYDROCHLORIDE 1 MG/ML
1 INJECTION INTRAMUSCULAR; INTRAVENOUS
Status: DISCONTINUED | OUTPATIENT
Start: 2022-11-24 | End: 2022-11-26

## 2022-11-24 RX ORDER — ALBUMIN, HUMAN INJ 5% 5 %
SOLUTION INTRAVENOUS CONTINUOUS PRN
Status: DISCONTINUED | OUTPATIENT
Start: 2022-11-24 | End: 2022-11-24 | Stop reason: SURG

## 2022-11-24 RX ORDER — PANTOPRAZOLE SODIUM 40 MG/10ML
40 INJECTION, POWDER, LYOPHILIZED, FOR SOLUTION INTRAVENOUS
Status: DISCONTINUED | OUTPATIENT
Start: 2022-11-25 | End: 2022-11-24 | Stop reason: SDUPTHER

## 2022-11-24 RX ORDER — DEXMEDETOMIDINE HYDROCHLORIDE 4 UG/ML
INJECTION, SOLUTION INTRAVENOUS
Status: COMPLETED
Start: 2022-11-24 | End: 2022-11-24

## 2022-11-24 RX ORDER — PROPOFOL 10 MG/ML
VIAL (ML) INTRAVENOUS AS NEEDED
Status: DISCONTINUED | OUTPATIENT
Start: 2022-11-24 | End: 2022-11-24 | Stop reason: SURG

## 2022-11-24 RX ORDER — DEXTROSE MONOHYDRATE 25 G/50ML
50 INJECTION, SOLUTION INTRAVENOUS ONCE
Status: COMPLETED | OUTPATIENT
Start: 2022-11-24 | End: 2022-11-24

## 2022-11-24 RX ORDER — PANTOPRAZOLE SODIUM 40 MG/10ML
40 INJECTION, POWDER, LYOPHILIZED, FOR SOLUTION INTRAVENOUS
Status: DISCONTINUED | OUTPATIENT
Start: 2022-11-24 | End: 2022-11-24 | Stop reason: SDUPTHER

## 2022-11-24 RX ORDER — NOREPINEPHRINE BIT/0.9 % NACL 8 MG/250ML
INFUSION BOTTLE (ML) INTRAVENOUS
Status: COMPLETED
Start: 2022-11-24 | End: 2022-11-24

## 2022-11-24 RX ORDER — FUROSEMIDE 10 MG/ML
20 INJECTION INTRAMUSCULAR; INTRAVENOUS ONCE
Status: COMPLETED | OUTPATIENT
Start: 2022-11-24 | End: 2022-11-24

## 2022-11-24 RX ORDER — SODIUM CHLORIDE, SODIUM LACTATE, POTASSIUM CHLORIDE, CALCIUM CHLORIDE 600; 310; 30; 20 MG/100ML; MG/100ML; MG/100ML; MG/100ML
INJECTION, SOLUTION INTRAVENOUS CONTINUOUS PRN
Status: DISCONTINUED | OUTPATIENT
Start: 2022-11-24 | End: 2022-11-24 | Stop reason: SURG

## 2022-11-24 RX ORDER — MAGNESIUM HYDROXIDE 1200 MG/15ML
LIQUID ORAL AS NEEDED
Status: DISCONTINUED | OUTPATIENT
Start: 2022-11-24 | End: 2022-11-24 | Stop reason: HOSPADM

## 2022-11-24 RX ORDER — CHLORHEXIDINE GLUCONATE 0.12 MG/ML
15 RINSE ORAL EVERY 12 HOURS SCHEDULED
Status: DISCONTINUED | OUTPATIENT
Start: 2022-11-24 | End: 2022-12-01 | Stop reason: HOSPADM

## 2022-11-24 RX ORDER — DEXMEDETOMIDINE HYDROCHLORIDE 4 UG/ML
.2-1.5 INJECTION, SOLUTION INTRAVENOUS
Status: DISCONTINUED | OUTPATIENT
Start: 2022-11-24 | End: 2022-11-26

## 2022-11-24 RX ORDER — NOREPINEPHRINE BIT/0.9 % NACL 8 MG/250ML
.02-.3 INFUSION BOTTLE (ML) INTRAVENOUS
Status: DISCONTINUED | OUTPATIENT
Start: 2022-11-24 | End: 2022-11-28

## 2022-11-24 RX ORDER — ROCURONIUM BROMIDE 10 MG/ML
INJECTION, SOLUTION INTRAVENOUS AS NEEDED
Status: DISCONTINUED | OUTPATIENT
Start: 2022-11-24 | End: 2022-11-24 | Stop reason: SURG

## 2022-11-24 RX ORDER — FUROSEMIDE 10 MG/ML
20 INJECTION INTRAMUSCULAR; INTRAVENOUS EVERY 12 HOURS
Status: DISCONTINUED | OUTPATIENT
Start: 2022-11-24 | End: 2022-11-24

## 2022-11-24 RX ORDER — FENTANYL CITRATE 50 UG/ML
INJECTION, SOLUTION INTRAMUSCULAR; INTRAVENOUS AS NEEDED
Status: DISCONTINUED | OUTPATIENT
Start: 2022-11-24 | End: 2022-11-24 | Stop reason: SURG

## 2022-11-24 RX ORDER — ENOXAPARIN SODIUM 100 MG/ML
30 INJECTION SUBCUTANEOUS
Status: DISCONTINUED | OUTPATIENT
Start: 2022-11-24 | End: 2022-11-24

## 2022-11-24 RX ORDER — SODIUM CHLORIDE 9 MG/ML
125 INJECTION, SOLUTION INTRAVENOUS CONTINUOUS
Status: DISCONTINUED | OUTPATIENT
Start: 2022-11-24 | End: 2022-11-24

## 2022-11-24 RX ADMIN — SODIUM CHLORIDE, POTASSIUM CHLORIDE, SODIUM LACTATE AND CALCIUM CHLORIDE 1000 ML: 600; 310; 30; 20 INJECTION, SOLUTION INTRAVENOUS at 08:27

## 2022-11-24 RX ADMIN — SUCRALFATE 1 G: 1 TABLET ORAL at 16:56

## 2022-11-24 RX ADMIN — ROCURONIUM BROMIDE 50 MG: 10 SOLUTION INTRAVENOUS at 18:34

## 2022-11-24 RX ADMIN — ROCURONIUM BROMIDE 50 MG: 10 SOLUTION INTRAVENOUS at 19:08

## 2022-11-24 RX ADMIN — PANTOPRAZOLE SODIUM 8 MG/HR: 40 INJECTION, POWDER, FOR SOLUTION INTRAVENOUS at 00:50

## 2022-11-24 RX ADMIN — PHENYLEPHRINE HYDROCHLORIDE 1 MCG/KG/MIN: 10 INJECTION INTRAVENOUS at 18:45

## 2022-11-24 RX ADMIN — CHLORHEXIDINE GLUCONATE 15 ML: 1.2 RINSE ORAL at 20:19

## 2022-11-24 RX ADMIN — ALBUMIN (HUMAN): 12.5 INJECTION, SOLUTION INTRAVENOUS at 18:56

## 2022-11-24 RX ADMIN — METRONIDAZOLE 500 MG: 500 INJECTION, SOLUTION INTRAVENOUS at 10:22

## 2022-11-24 RX ADMIN — LIDOCAINE HYDROCHLORIDE 100 MG: 20 INJECTION, SOLUTION EPIDURAL; INFILTRATION; INTRACAUDAL; PERINEURAL at 18:34

## 2022-11-24 RX ADMIN — METRONIDAZOLE 500 MG: 500 INJECTION, SOLUTION INTRAVENOUS at 16:56

## 2022-11-24 RX ADMIN — TAZOBACTAM SODIUM AND PIPERACILLIN SODIUM 3.38 G: 375; 3 INJECTION, SOLUTION INTRAVENOUS at 13:34

## 2022-11-24 RX ADMIN — SODIUM CHLORIDE 125 ML/HR: 9 INJECTION, SOLUTION INTRAVENOUS at 15:45

## 2022-11-24 RX ADMIN — HYDROMORPHONE HYDROCHLORIDE 0.5 MG: 1 INJECTION, SOLUTION INTRAMUSCULAR; INTRAVENOUS; SUBCUTANEOUS at 10:23

## 2022-11-24 RX ADMIN — Medication 10 ML: at 04:03

## 2022-11-24 RX ADMIN — FENTANYL CITRATE 100 MCG: 50 INJECTION, SOLUTION INTRAMUSCULAR; INTRAVENOUS at 18:34

## 2022-11-24 RX ADMIN — DEXMEDETOMIDINE HYDROCHLORIDE 1 MCG/KG/HR: 4 INJECTION, SOLUTION INTRAVENOUS at 19:37

## 2022-11-24 RX ADMIN — METRONIDAZOLE 500 MG: 500 INJECTION, SOLUTION INTRAVENOUS at 04:03

## 2022-11-24 RX ADMIN — PANTOPRAZOLE SODIUM 8 MG/HR: 40 INJECTION, POWDER, FOR SOLUTION INTRAVENOUS at 10:27

## 2022-11-24 RX ADMIN — TAZOBACTAM SODIUM AND PIPERACILLIN SODIUM 3.38 G: 375; 3 INJECTION, SOLUTION INTRAVENOUS at 05:25

## 2022-11-24 RX ADMIN — CALCIUM GLUCONATE 1 G: 98 INJECTION, SOLUTION INTRAVENOUS at 15:36

## 2022-11-24 RX ADMIN — SUCRALFATE 1 G: 1 TABLET ORAL at 21:22

## 2022-11-24 RX ADMIN — METRONIDAZOLE 500 MG: 500 INJECTION, SOLUTION INTRAVENOUS at 22:48

## 2022-11-24 RX ADMIN — ENOXAPARIN SODIUM 30 MG: 100 INJECTION SUBCUTANEOUS at 12:02

## 2022-11-24 RX ADMIN — HYDROMORPHONE HYDROCHLORIDE 0.5 MG: 1 INJECTION, SOLUTION INTRAMUSCULAR; INTRAVENOUS; SUBCUTANEOUS at 04:03

## 2022-11-24 RX ADMIN — Medication 10 ML: at 20:18

## 2022-11-24 RX ADMIN — MORPHINE SULFATE 2 MG/HR: 10 INJECTION INTRAVENOUS at 22:00

## 2022-11-24 RX ADMIN — SUCRALFATE 1 G: 1 TABLET ORAL at 08:26

## 2022-11-24 RX ADMIN — HYDROMORPHONE HYDROCHLORIDE 0.5 MG: 1 INJECTION, SOLUTION INTRAMUSCULAR; INTRAVENOUS; SUBCUTANEOUS at 15:45

## 2022-11-24 RX ADMIN — ALBUMIN (HUMAN): 12.5 INJECTION, SOLUTION INTRAVENOUS at 19:09

## 2022-11-24 RX ADMIN — SUCRALFATE 1 G: 1 TABLET ORAL at 12:03

## 2022-11-24 RX ADMIN — SODIUM BICARBONATE 75 MEQ: 84 INJECTION, SOLUTION INTRAVENOUS at 16:56

## 2022-11-24 RX ADMIN — SODIUM CHLORIDE, POTASSIUM CHLORIDE, SODIUM LACTATE AND CALCIUM CHLORIDE 1000 ML: 600; 310; 30; 20 INJECTION, SOLUTION INTRAVENOUS at 06:24

## 2022-11-24 RX ADMIN — HYDROMORPHONE HYDROCHLORIDE 0.5 MG: 1 INJECTION, SOLUTION INTRAMUSCULAR; INTRAVENOUS; SUBCUTANEOUS at 20:07

## 2022-11-24 RX ADMIN — SODIUM CHLORIDE, SODIUM LACTATE, POTASSIUM CHLORIDE, CALCIUM CHLORIDE: 600; 310; 30; 20 INJECTION, SOLUTION INTRAVENOUS at 19:22

## 2022-11-24 RX ADMIN — PROPOFOL 200 MG: 10 INJECTION, EMULSION INTRAVENOUS at 18:34

## 2022-11-24 RX ADMIN — ONDANSETRON 4 MG: 2 INJECTION INTRAMUSCULAR; INTRAVENOUS at 13:30

## 2022-11-24 RX ADMIN — MIDAZOLAM 1 MG: 1 INJECTION INTRAMUSCULAR; INTRAVENOUS at 21:18

## 2022-11-24 RX ADMIN — DEXTROSE MONOHYDRATE 50 ML: 25 INJECTION, SOLUTION INTRAVENOUS at 15:15

## 2022-11-24 RX ADMIN — DEXMEDETOMIDINE HYDROCHLORIDE 1 MCG/KG/HR: 4 INJECTION, SOLUTION INTRAVENOUS at 20:19

## 2022-11-24 RX ADMIN — PANTOPRAZOLE SODIUM 8 MG/HR: 40 INJECTION, POWDER, FOR SOLUTION INTRAVENOUS at 05:26

## 2022-11-24 RX ADMIN — Medication 0.02 MCG/KG/MIN: at 22:48

## 2022-11-24 RX ADMIN — HYDROMORPHONE HYDROCHLORIDE 0.5 MG: 1 INJECTION, SOLUTION INTRAMUSCULAR; INTRAVENOUS; SUBCUTANEOUS at 07:23

## 2022-11-24 RX ADMIN — POTASSIUM CHLORIDE, DEXTROSE MONOHYDRATE AND SODIUM CHLORIDE 125 ML/HR: 150; 5; 450 INJECTION, SOLUTION INTRAVENOUS at 03:02

## 2022-11-24 RX ADMIN — SODIUM CHLORIDE, SODIUM LACTATE, POTASSIUM CHLORIDE, CALCIUM CHLORIDE: 600; 310; 30; 20 INJECTION, SOLUTION INTRAVENOUS at 18:29

## 2022-11-24 RX ADMIN — FUROSEMIDE 20 MG: 10 INJECTION, SOLUTION INTRAMUSCULAR; INTRAVENOUS at 16:56

## 2022-11-24 RX ADMIN — ONDANSETRON 4 MG: 2 INJECTION INTRAMUSCULAR; INTRAVENOUS at 20:13

## 2022-11-24 RX ADMIN — DEXMEDETOMIDINE HYDROCHLORIDE 1.5 MCG/KG/HR: 4 INJECTION, SOLUTION INTRAVENOUS at 23:37

## 2022-11-24 RX ADMIN — CEFEPIME 2 G: 2 INJECTION, POWDER, FOR SOLUTION INTRAVENOUS at 20:49

## 2022-11-24 RX ADMIN — SODIUM CHLORIDE, POTASSIUM CHLORIDE, SODIUM LACTATE AND CALCIUM CHLORIDE: 600; 310; 30; 20 INJECTION, SOLUTION INTRAVENOUS at 18:29

## 2022-11-24 RX ADMIN — SODIUM CHLORIDE, POTASSIUM CHLORIDE, SODIUM LACTATE AND CALCIUM CHLORIDE: 600; 310; 30; 20 INJECTION, SOLUTION INTRAVENOUS at 19:20

## 2022-11-24 RX ADMIN — INSULIN HUMAN 10 UNITS: 100 INJECTION, SOLUTION PARENTERAL at 15:14

## 2022-11-25 ENCOUNTER — APPOINTMENT (OUTPATIENT)
Dept: GENERAL RADIOLOGY | Facility: HOSPITAL | Age: 42
End: 2022-11-25

## 2022-11-25 ENCOUNTER — ANESTHESIA (OUTPATIENT)
Dept: PERIOP | Facility: HOSPITAL | Age: 42
End: 2022-11-25

## 2022-11-25 ENCOUNTER — APPOINTMENT (OUTPATIENT)
Dept: CARDIOLOGY | Facility: HOSPITAL | Age: 42
End: 2022-11-25

## 2022-11-25 ENCOUNTER — ANESTHESIA EVENT (OUTPATIENT)
Dept: PERIOP | Facility: HOSPITAL | Age: 42
End: 2022-11-25

## 2022-11-25 LAB
ALBUMIN SERPL-MCNC: 2.4 G/DL (ref 3.5–5.2)
ALBUMIN/GLOB SERPL: 1.5 G/DL
ALP SERPL-CCNC: 19 U/L (ref 39–117)
ALT SERPL W P-5'-P-CCNC: 10 U/L (ref 1–41)
ANION GAP SERPL CALCULATED.3IONS-SCNC: 3 MMOL/L (ref 5–15)
ANION GAP SERPL CALCULATED.3IONS-SCNC: 4 MMOL/L (ref 5–15)
ANION GAP SERPL CALCULATED.3IONS-SCNC: 4 MMOL/L (ref 5–15)
ANION GAP SERPL CALCULATED.3IONS-SCNC: 6 MMOL/L (ref 5–15)
ARTERIAL PATENCY WRIST A: POSITIVE
AST SERPL-CCNC: 17 U/L (ref 1–40)
ATMOSPHERIC PRESS: 754 MMHG
BASE EXCESS BLDA CALC-SCNC: 2.7 MMOL/L (ref 0–2)
BASOPHILS # BLD AUTO: 0.01 10*3/MM3 (ref 0–0.2)
BASOPHILS # BLD AUTO: 0.02 10*3/MM3 (ref 0–0.2)
BASOPHILS NFR BLD AUTO: 0.1 % (ref 0–1.5)
BASOPHILS NFR BLD AUTO: 0.1 % (ref 0–1.5)
BDY SITE: ABNORMAL
BH CV ECHO MEAS - AO MAX PG: 7.4 MMHG
BH CV ECHO MEAS - AO MEAN PG: 4 MMHG
BH CV ECHO MEAS - AO ROOT DIAM: 3.4 CM
BH CV ECHO MEAS - AO V2 MAX: 136 CM/SEC
BH CV ECHO MEAS - AO V2 VTI: 24.6 CM
BH CV ECHO MEAS - AVA(I,D): 3.7 CM2
BH CV ECHO MEAS - EDV(CUBED): 128 ML
BH CV ECHO MEAS - EDV(MOD-SP4): 86.1 ML
BH CV ECHO MEAS - EF(MOD-SP4): 67.6 %
BH CV ECHO MEAS - ESV(CUBED): 37.9 ML
BH CV ECHO MEAS - ESV(MOD-SP4): 27.9 ML
BH CV ECHO MEAS - FS: 33.3 %
BH CV ECHO MEAS - IVS/LVPW: 1.01 CM
BH CV ECHO MEAS - IVSD: 1.05 CM
BH CV ECHO MEAS - LA DIMENSION: 3.5 CM
BH CV ECHO MEAS - LAT PEAK E' VEL: 14 CM/SEC
BH CV ECHO MEAS - LV DIASTOLIC VOL/BSA (35-75): 41 CM2
BH CV ECHO MEAS - LV MASS(C)D: 195.7 GRAMS
BH CV ECHO MEAS - LV MAX PG: 4.8 MMHG
BH CV ECHO MEAS - LV MEAN PG: 3 MMHG
BH CV ECHO MEAS - LV SYSTOLIC VOL/BSA (12-30): 13.3 CM2
BH CV ECHO MEAS - LV V1 MAX: 109 CM/SEC
BH CV ECHO MEAS - LV V1 VTI: 19.9 CM
BH CV ECHO MEAS - LVIDD: 5 CM
BH CV ECHO MEAS - LVIDS: 3.4 CM
BH CV ECHO MEAS - LVOT AREA: 4.5 CM2
BH CV ECHO MEAS - LVOT DIAM: 2.4 CM
BH CV ECHO MEAS - LVPWD: 1.04 CM
BH CV ECHO MEAS - MED PEAK E' VEL: 10 CM/SEC
BH CV ECHO MEAS - MV A MAX VEL: 45.1 CM/SEC
BH CV ECHO MEAS - MV DEC TIME: 0.25 MSEC
BH CV ECHO MEAS - MV E MAX VEL: 52.4 CM/SEC
BH CV ECHO MEAS - MV E/A: 1.16
BH CV ECHO MEAS - PA V2 MAX: 82.5 CM/SEC
BH CV ECHO MEAS - RAP SYSTOLE: 5 MMHG
BH CV ECHO MEAS - RVSP: 20.5 MMHG
BH CV ECHO MEAS - SI(MOD-SP4): 27.7 ML/M2
BH CV ECHO MEAS - SV(LVOT): 90 ML
BH CV ECHO MEAS - SV(MOD-SP4): 58.2 ML
BH CV ECHO MEAS - TR MAX PG: 15.5 MMHG
BH CV ECHO MEAS - TR MAX VEL: 197 CM/SEC
BH CV ECHO MEASUREMENTS AVERAGE E/E' RATIO: 4.37
BILIRUB SERPL-MCNC: 0.7 MG/DL (ref 0–1.2)
BODY TEMPERATURE: 37 C
BUN SERPL-MCNC: 41 MG/DL (ref 6–20)
BUN SERPL-MCNC: 46 MG/DL (ref 6–20)
BUN SERPL-MCNC: 52 MG/DL (ref 6–20)
BUN SERPL-MCNC: 60 MG/DL (ref 6–20)
BUN/CREAT SERPL: 18.5 (ref 7–25)
BUN/CREAT SERPL: 23 (ref 7–25)
BUN/CREAT SERPL: 28.2 (ref 7–25)
BUN/CREAT SERPL: 30.8 (ref 7–25)
CALCIUM SPEC-SCNC: 7.2 MG/DL (ref 8.6–10.5)
CALCIUM SPEC-SCNC: 7.3 MG/DL (ref 8.6–10.5)
CALCIUM SPEC-SCNC: 7.3 MG/DL (ref 8.6–10.5)
CALCIUM SPEC-SCNC: 7.4 MG/DL (ref 8.6–10.5)
CHLORIDE SERPL-SCNC: 108 MMOL/L (ref 98–107)
CHLORIDE SERPL-SCNC: 108 MMOL/L (ref 98–107)
CHLORIDE SERPL-SCNC: 109 MMOL/L (ref 98–107)
CHLORIDE SERPL-SCNC: 111 MMOL/L (ref 98–107)
CK SERPL-CCNC: 79 U/L (ref 20–200)
CO2 SERPL-SCNC: 26 MMOL/L (ref 22–29)
CO2 SERPL-SCNC: 29 MMOL/L (ref 22–29)
CO2 SERPL-SCNC: 29 MMOL/L (ref 22–29)
CO2 SERPL-SCNC: 30 MMOL/L (ref 22–29)
CREAT SERPL-MCNC: 1.33 MG/DL (ref 0.76–1.27)
CREAT SERPL-MCNC: 1.63 MG/DL (ref 0.76–1.27)
CREAT SERPL-MCNC: 2.26 MG/DL (ref 0.76–1.27)
CREAT SERPL-MCNC: 3.25 MG/DL (ref 0.76–1.27)
DEPRECATED RDW RBC AUTO: 45.8 FL (ref 37–54)
DEPRECATED RDW RBC AUTO: 46.2 FL (ref 37–54)
DEPRECATED RDW RBC AUTO: 47.9 FL (ref 37–54)
EGFRCR SERPLBLD CKD-EPI 2021: 23.3 ML/MIN/1.73
EGFRCR SERPLBLD CKD-EPI 2021: 36 ML/MIN/1.73
EGFRCR SERPLBLD CKD-EPI 2021: 53.3 ML/MIN/1.73
EGFRCR SERPLBLD CKD-EPI 2021: 68 ML/MIN/1.73
EOSINOPHIL # BLD AUTO: 0 10*3/MM3 (ref 0–0.4)
EOSINOPHIL # BLD AUTO: 0 10*3/MM3 (ref 0–0.4)
EOSINOPHIL NFR BLD AUTO: 0 % (ref 0.3–6.2)
EOSINOPHIL NFR BLD AUTO: 0 % (ref 0.3–6.2)
ERYTHROCYTE [DISTWIDTH] IN BLOOD BY AUTOMATED COUNT: 13.4 % (ref 12.3–15.4)
ERYTHROCYTE [DISTWIDTH] IN BLOOD BY AUTOMATED COUNT: 13.4 % (ref 12.3–15.4)
ERYTHROCYTE [DISTWIDTH] IN BLOOD BY AUTOMATED COUNT: 13.5 % (ref 12.3–15.4)
GLOBULIN UR ELPH-MCNC: 1.6 GM/DL
GLUCOSE BLDC GLUCOMTR-MCNC: 102 MG/DL (ref 70–130)
GLUCOSE SERPL-MCNC: 106 MG/DL (ref 65–99)
GLUCOSE SERPL-MCNC: 112 MG/DL (ref 65–99)
GLUCOSE SERPL-MCNC: 119 MG/DL (ref 65–99)
GLUCOSE SERPL-MCNC: 98 MG/DL (ref 65–99)
HCO3 BLDA-SCNC: 27.6 MMOL/L (ref 20–26)
HCT VFR BLD AUTO: 22.4 % (ref 37.5–51)
HCT VFR BLD AUTO: 23.1 % (ref 37.5–51)
HCT VFR BLD AUTO: 24.9 % (ref 37.5–51)
HGB BLD-MCNC: 7.3 G/DL (ref 13–17.7)
HGB BLD-MCNC: 7.5 G/DL (ref 13–17.7)
HGB BLD-MCNC: 8.3 G/DL (ref 13–17.7)
IMM GRANULOCYTES # BLD AUTO: 0.05 10*3/MM3 (ref 0–0.05)
IMM GRANULOCYTES # BLD AUTO: 0.07 10*3/MM3 (ref 0–0.05)
IMM GRANULOCYTES NFR BLD AUTO: 0.4 % (ref 0–0.5)
IMM GRANULOCYTES NFR BLD AUTO: 0.5 % (ref 0–0.5)
INHALED O2 CONCENTRATION: 30 %
LEFT ATRIUM VOLUME INDEX: 22.6 ML/M2
LEFT ATRIUM VOLUME: 47.5 ML
LYMPHOCYTES # BLD AUTO: 1.35 10*3/MM3 (ref 0.7–3.1)
LYMPHOCYTES # BLD AUTO: 1.66 10*3/MM3 (ref 0.7–3.1)
LYMPHOCYTES NFR BLD AUTO: 12.2 % (ref 19.6–45.3)
LYMPHOCYTES NFR BLD AUTO: 9.8 % (ref 19.6–45.3)
Lab: ABNORMAL
MAXIMAL PREDICTED HEART RATE: 177 BPM
MCH RBC QN AUTO: 31.1 PG (ref 26.6–33)
MCH RBC QN AUTO: 31.1 PG (ref 26.6–33)
MCH RBC QN AUTO: 31.4 PG (ref 26.6–33)
MCHC RBC AUTO-ENTMCNC: 32.5 G/DL (ref 31.5–35.7)
MCHC RBC AUTO-ENTMCNC: 32.6 G/DL (ref 31.5–35.7)
MCHC RBC AUTO-ENTMCNC: 33.3 G/DL (ref 31.5–35.7)
MCV RBC AUTO: 94.3 FL (ref 79–97)
MCV RBC AUTO: 95.3 FL (ref 79–97)
MCV RBC AUTO: 95.9 FL (ref 79–97)
MODALITY: ABNORMAL
MONOCYTES # BLD AUTO: 0.71 10*3/MM3 (ref 0.1–0.9)
MONOCYTES # BLD AUTO: 1.29 10*3/MM3 (ref 0.1–0.9)
MONOCYTES NFR BLD AUTO: 6.4 % (ref 5–12)
MONOCYTES NFR BLD AUTO: 7.6 % (ref 5–12)
NEUTROPHILS NFR BLD AUTO: 13.87 10*3/MM3 (ref 1.7–7)
NEUTROPHILS NFR BLD AUTO: 8.93 10*3/MM3 (ref 1.7–7)
NEUTROPHILS NFR BLD AUTO: 80.8 % (ref 42.7–76)
NEUTROPHILS NFR BLD AUTO: 82.1 % (ref 42.7–76)
NRBC BLD AUTO-RTO: 0 /100 WBC (ref 0–0.2)
NRBC BLD AUTO-RTO: 0 /100 WBC (ref 0–0.2)
PCO2 BLDA: 43 MM HG (ref 35–45)
PCO2 TEMP ADJ BLD: 43 MM HG (ref 35–45)
PEEP RESPIRATORY: 8 CM[H2O]
PH BLDA: 7.42 PH UNITS (ref 7.35–7.45)
PH, TEMP CORRECTED: 7.42 PH UNITS (ref 7.35–7.45)
PLATELET # BLD AUTO: 129 10*3/MM3 (ref 140–450)
PLATELET # BLD AUTO: 146 10*3/MM3 (ref 140–450)
PLATELET # BLD AUTO: 155 10*3/MM3 (ref 140–450)
PMV BLD AUTO: 9.7 FL (ref 6–12)
PMV BLD AUTO: 9.7 FL (ref 6–12)
PMV BLD AUTO: 9.9 FL (ref 6–12)
PO2 BLDA: 128 MM HG (ref 83–108)
PO2 TEMP ADJ BLD: 128 MM HG (ref 83–108)
POTASSIUM SERPL-SCNC: 4.1 MMOL/L (ref 3.5–5.2)
POTASSIUM SERPL-SCNC: 4.6 MMOL/L (ref 3.5–5.2)
POTASSIUM SERPL-SCNC: 5 MMOL/L (ref 3.5–5.2)
POTASSIUM SERPL-SCNC: 5.5 MMOL/L (ref 3.5–5.2)
PROT SERPL-MCNC: 4 G/DL (ref 6–8.5)
RBC # BLD AUTO: 2.35 10*6/MM3 (ref 4.14–5.8)
RBC # BLD AUTO: 2.41 10*6/MM3 (ref 4.14–5.8)
RBC # BLD AUTO: 2.64 10*6/MM3 (ref 4.14–5.8)
SAO2 % BLDCOA: 99.4 % (ref 94–99)
SET MECH RESP RATE: 18
SODIUM SERPL-SCNC: 140 MMOL/L (ref 136–145)
SODIUM SERPL-SCNC: 140 MMOL/L (ref 136–145)
SODIUM SERPL-SCNC: 143 MMOL/L (ref 136–145)
SODIUM SERPL-SCNC: 144 MMOL/L (ref 136–145)
STRESS TARGET HR: 150 BPM
VENTILATOR MODE: AC
VT ON VENT VENT: 550 ML
WBC NRBC COR # BLD: 11.05 10*3/MM3 (ref 3.4–10.8)
WBC NRBC COR # BLD: 16.91 10*3/MM3 (ref 3.4–10.8)
WBC NRBC COR # BLD: 17.96 10*3/MM3 (ref 3.4–10.8)

## 2022-11-25 PROCEDURE — 71045 X-RAY EXAM CHEST 1 VIEW: CPT

## 2022-11-25 PROCEDURE — 86920 COMPATIBILITY TEST SPIN: CPT

## 2022-11-25 PROCEDURE — 93306 TTE W/DOPPLER COMPLETE: CPT

## 2022-11-25 PROCEDURE — 94003 VENT MGMT INPAT SUBQ DAY: CPT

## 2022-11-25 PROCEDURE — C1765 ADHESION BARRIER: HCPCS | Performed by: SPECIALIST

## 2022-11-25 PROCEDURE — 94799 UNLISTED PULMONARY SVC/PX: CPT

## 2022-11-25 PROCEDURE — 80053 COMPREHEN METABOLIC PANEL: CPT | Performed by: SPECIALIST

## 2022-11-25 PROCEDURE — P9016 RBC LEUKOCYTES REDUCED: HCPCS

## 2022-11-25 PROCEDURE — 25010000002 MORPHINE SULFATE 10 MG/ML SOLUTION: Performed by: FAMILY MEDICINE

## 2022-11-25 PROCEDURE — 82803 BLOOD GASES ANY COMBINATION: CPT

## 2022-11-25 PROCEDURE — 93306 TTE W/DOPPLER COMPLETE: CPT | Performed by: EMERGENCY MEDICINE

## 2022-11-25 PROCEDURE — 25010000002 ONDANSETRON PER 1 MG

## 2022-11-25 PROCEDURE — 25010000002 PROPOFOL 10 MG/ML EMULSION

## 2022-11-25 PROCEDURE — 36600 WITHDRAWAL OF ARTERIAL BLOOD: CPT

## 2022-11-25 PROCEDURE — 44160 REMOVAL OF COLON: CPT | Performed by: SPECIALIST

## 2022-11-25 PROCEDURE — 86901 BLOOD TYPING SEROLOGIC RH(D): CPT

## 2022-11-25 PROCEDURE — 36430 TRANSFUSION BLD/BLD COMPNT: CPT

## 2022-11-25 PROCEDURE — 99255 IP/OBS CONSLTJ NEW/EST HI 80: CPT | Performed by: INTERNAL MEDICINE

## 2022-11-25 PROCEDURE — 25010000002 MIDAZOLAM PER 1 MG: Performed by: FAMILY MEDICINE

## 2022-11-25 PROCEDURE — 85027 COMPLETE CBC AUTOMATED: CPT | Performed by: FAMILY MEDICINE

## 2022-11-25 PROCEDURE — 0DTF0ZZ RESECTION OF RIGHT LARGE INTESTINE, OPEN APPROACH: ICD-10-PCS | Performed by: SPECIALIST

## 2022-11-25 PROCEDURE — 86900 BLOOD TYPING SEROLOGIC ABO: CPT

## 2022-11-25 PROCEDURE — 82962 GLUCOSE BLOOD TEST: CPT

## 2022-11-25 PROCEDURE — 25010000002 FENTANYL CITRATE (PF) 100 MCG/2ML SOLUTION

## 2022-11-25 PROCEDURE — 25010000002 CEFEPIME PER 500 MG: Performed by: SPECIALIST

## 2022-11-25 PROCEDURE — 85025 COMPLETE CBC W/AUTO DIFF WBC: CPT | Performed by: SPECIALIST

## 2022-11-25 PROCEDURE — 82550 ASSAY OF CK (CPK): CPT | Performed by: SPECIALIST

## 2022-11-25 PROCEDURE — 25010000002 PHENYLEPHRINE 10 MG/ML SOLUTION 5 ML VIAL

## 2022-11-25 PROCEDURE — 25010000002 ENOXAPARIN PER 10 MG: Performed by: SPECIALIST

## 2022-11-25 PROCEDURE — 88307 TISSUE EXAM BY PATHOLOGIST: CPT | Performed by: SPECIALIST

## 2022-11-25 PROCEDURE — 99024 POSTOP FOLLOW-UP VISIT: CPT | Performed by: SPECIALIST

## 2022-11-25 DEVICE — STAPLER WITH DST SERIES TECHNOLOGY
Type: IMPLANTABLE DEVICE | Site: ABDOMEN | Status: FUNCTIONAL
Brand: TA

## 2022-11-25 RX ORDER — PROPOFOL 10 MG/ML
VIAL (ML) INTRAVENOUS AS NEEDED
Status: DISCONTINUED | OUTPATIENT
Start: 2022-11-25 | End: 2022-11-25 | Stop reason: SURG

## 2022-11-25 RX ORDER — DROPERIDOL 2.5 MG/ML
0.62 INJECTION, SOLUTION INTRAMUSCULAR; INTRAVENOUS ONCE AS NEEDED
Status: DISCONTINUED | OUTPATIENT
Start: 2022-11-25 | End: 2022-11-25 | Stop reason: HOSPADM

## 2022-11-25 RX ORDER — ONDANSETRON 2 MG/ML
4 INJECTION INTRAMUSCULAR; INTRAVENOUS
Status: DISCONTINUED | OUTPATIENT
Start: 2022-11-25 | End: 2022-11-25 | Stop reason: HOSPADM

## 2022-11-25 RX ORDER — SODIUM CHLORIDE, SODIUM LACTATE, POTASSIUM CHLORIDE, CALCIUM CHLORIDE 600; 310; 30; 20 MG/100ML; MG/100ML; MG/100ML; MG/100ML
INJECTION, SOLUTION INTRAVENOUS CONTINUOUS PRN
Status: DISCONTINUED | OUTPATIENT
Start: 2022-11-25 | End: 2022-11-25 | Stop reason: SURG

## 2022-11-25 RX ORDER — FLUMAZENIL 0.1 MG/ML
0.2 INJECTION INTRAVENOUS AS NEEDED
Status: DISCONTINUED | OUTPATIENT
Start: 2022-11-25 | End: 2022-11-25 | Stop reason: HOSPADM

## 2022-11-25 RX ORDER — SODIUM CHLORIDE 9 MG/ML
125 INJECTION, SOLUTION INTRAVENOUS CONTINUOUS
Status: DISCONTINUED | OUTPATIENT
Start: 2022-11-25 | End: 2022-11-26

## 2022-11-25 RX ORDER — FENTANYL CITRATE 50 UG/ML
25 INJECTION, SOLUTION INTRAMUSCULAR; INTRAVENOUS
Status: DISCONTINUED | OUTPATIENT
Start: 2022-11-25 | End: 2022-11-25 | Stop reason: SDUPTHER

## 2022-11-25 RX ORDER — EPHEDRINE SULFATE 50 MG/ML
INJECTION, SOLUTION INTRAVENOUS AS NEEDED
Status: DISCONTINUED | OUTPATIENT
Start: 2022-11-25 | End: 2022-11-25 | Stop reason: SURG

## 2022-11-25 RX ORDER — ONDANSETRON 2 MG/ML
INJECTION INTRAMUSCULAR; INTRAVENOUS AS NEEDED
Status: DISCONTINUED | OUTPATIENT
Start: 2022-11-25 | End: 2022-11-25 | Stop reason: SURG

## 2022-11-25 RX ORDER — NALOXONE HCL 0.4 MG/ML
0.04 VIAL (ML) INJECTION AS NEEDED
Status: DISCONTINUED | OUTPATIENT
Start: 2022-11-25 | End: 2022-11-25 | Stop reason: HOSPADM

## 2022-11-25 RX ORDER — OXYCODONE AND ACETAMINOPHEN 10; 325 MG/1; MG/1
1 TABLET ORAL ONCE AS NEEDED
Status: DISCONTINUED | OUTPATIENT
Start: 2022-11-25 | End: 2022-11-25 | Stop reason: SDUPTHER

## 2022-11-25 RX ORDER — FENTANYL CITRATE 50 UG/ML
INJECTION, SOLUTION INTRAMUSCULAR; INTRAVENOUS AS NEEDED
Status: DISCONTINUED | OUTPATIENT
Start: 2022-11-25 | End: 2022-11-25 | Stop reason: SURG

## 2022-11-25 RX ORDER — HYDROMORPHONE HYDROCHLORIDE 1 MG/ML
0.5 INJECTION, SOLUTION INTRAMUSCULAR; INTRAVENOUS; SUBCUTANEOUS
Status: DISCONTINUED | OUTPATIENT
Start: 2022-11-25 | End: 2022-11-25 | Stop reason: SDUPTHER

## 2022-11-25 RX ORDER — BUPIVACAINE HCL/0.9 % NACL/PF 0.125 %
PLASTIC BAG, INJECTION (ML) EPIDURAL AS NEEDED
Status: DISCONTINUED | OUTPATIENT
Start: 2022-11-25 | End: 2022-11-25 | Stop reason: SURG

## 2022-11-25 RX ORDER — LABETALOL HYDROCHLORIDE 5 MG/ML
5 INJECTION, SOLUTION INTRAVENOUS
Status: DISCONTINUED | OUTPATIENT
Start: 2022-11-25 | End: 2022-11-25 | Stop reason: HOSPADM

## 2022-11-25 RX ORDER — ALBUTEROL SULFATE 2.5 MG/3ML
2.5 SOLUTION RESPIRATORY (INHALATION) EVERY 6 HOURS PRN
Status: DISCONTINUED | OUTPATIENT
Start: 2022-11-25 | End: 2022-12-01 | Stop reason: HOSPADM

## 2022-11-25 RX ORDER — ROCURONIUM BROMIDE 10 MG/ML
INJECTION, SOLUTION INTRAVENOUS AS NEEDED
Status: DISCONTINUED | OUTPATIENT
Start: 2022-11-25 | End: 2022-11-25 | Stop reason: SURG

## 2022-11-25 RX ORDER — MAGNESIUM HYDROXIDE 1200 MG/15ML
LIQUID ORAL AS NEEDED
Status: DISCONTINUED | OUTPATIENT
Start: 2022-11-25 | End: 2022-11-25 | Stop reason: HOSPADM

## 2022-11-25 RX ADMIN — METRONIDAZOLE 500 MG: 500 INJECTION, SOLUTION INTRAVENOUS at 22:25

## 2022-11-25 RX ADMIN — METRONIDAZOLE 500 MG: 500 INJECTION, SOLUTION INTRAVENOUS at 10:14

## 2022-11-25 RX ADMIN — SODIUM BICARBONATE 75 MEQ: 84 INJECTION, SOLUTION INTRAVENOUS at 11:53

## 2022-11-25 RX ADMIN — DEXMEDETOMIDINE HYDROCHLORIDE 1.5 MCG/KG/HR: 4 INJECTION, SOLUTION INTRAVENOUS at 21:35

## 2022-11-25 RX ADMIN — EPHEDRINE SULFATE 5 MG: 50 INJECTION INTRAVENOUS at 18:09

## 2022-11-25 RX ADMIN — PANTOPRAZOLE SODIUM 8 MG/HR: 40 INJECTION, POWDER, FOR SOLUTION INTRAVENOUS at 10:15

## 2022-11-25 RX ADMIN — PANTOPRAZOLE SODIUM 8 MG/HR: 40 INJECTION, POWDER, FOR SOLUTION INTRAVENOUS at 04:10

## 2022-11-25 RX ADMIN — ROCURONIUM BROMIDE 50 MG: 10 INJECTION, SOLUTION INTRAVENOUS at 16:45

## 2022-11-25 RX ADMIN — ENOXAPARIN SODIUM 30 MG: 100 INJECTION SUBCUTANEOUS at 05:02

## 2022-11-25 RX ADMIN — PANTOPRAZOLE SODIUM 8 MG/HR: 40 INJECTION, POWDER, FOR SOLUTION INTRAVENOUS at 16:24

## 2022-11-25 RX ADMIN — ENOXAPARIN SODIUM 30 MG: 100 INJECTION SUBCUTANEOUS at 20:26

## 2022-11-25 RX ADMIN — PANTOPRAZOLE SODIUM 8 MG/HR: 40 INJECTION, POWDER, FOR SOLUTION INTRAVENOUS at 01:30

## 2022-11-25 RX ADMIN — MIDAZOLAM 1 MG: 1 INJECTION INTRAMUSCULAR; INTRAVENOUS at 01:28

## 2022-11-25 RX ADMIN — ONDANSETRON 4 MG: 2 INJECTION INTRAMUSCULAR; INTRAVENOUS at 18:21

## 2022-11-25 RX ADMIN — ROCURONIUM BROMIDE 50 MG: 10 INJECTION, SOLUTION INTRAVENOUS at 16:57

## 2022-11-25 RX ADMIN — PROPOFOL 50 MG: 10 INJECTION, EMULSION INTRAVENOUS at 16:36

## 2022-11-25 RX ADMIN — PANTOPRAZOLE SODIUM 8 MG/HR: 40 INJECTION, POWDER, FOR SOLUTION INTRAVENOUS at 21:35

## 2022-11-25 RX ADMIN — EPHEDRINE SULFATE 5 MG: 50 INJECTION INTRAVENOUS at 18:32

## 2022-11-25 RX ADMIN — Medication 10 ML: at 20:25

## 2022-11-25 RX ADMIN — SODIUM BICARBONATE 75 MEQ: 84 INJECTION, SOLUTION INTRAVENOUS at 04:21

## 2022-11-25 RX ADMIN — SUCRALFATE 1 G: 1 TABLET ORAL at 06:34

## 2022-11-25 RX ADMIN — Medication 10 ML: at 10:08

## 2022-11-25 RX ADMIN — PANTOPRAZOLE SODIUM 8 MG/HR: 40 INJECTION, POWDER, FOR SOLUTION INTRAVENOUS at 09:37

## 2022-11-25 RX ADMIN — PANTOPRAZOLE SODIUM 8 MG/HR: 40 INJECTION, POWDER, FOR SOLUTION INTRAVENOUS at 15:17

## 2022-11-25 RX ADMIN — SUGAMMADEX 200 MG: 100 INJECTION, SOLUTION INTRAVENOUS at 19:24

## 2022-11-25 RX ADMIN — DEXMEDETOMIDINE HYDROCHLORIDE 1.5 MCG/KG/HR: 4 INJECTION, SOLUTION INTRAVENOUS at 16:24

## 2022-11-25 RX ADMIN — SODIUM CHLORIDE 500 ML: 9 INJECTION, SOLUTION INTRAVENOUS at 08:00

## 2022-11-25 RX ADMIN — MORPHINE SULFATE 2 MG/HR: 10 INJECTION INTRAVENOUS at 11:53

## 2022-11-25 RX ADMIN — DEXMEDETOMIDINE HYDROCHLORIDE 1.5 MCG/KG/HR: 4 INJECTION, SOLUTION INTRAVENOUS at 06:34

## 2022-11-25 RX ADMIN — EPHEDRINE SULFATE 10 MG: 50 INJECTION INTRAVENOUS at 16:48

## 2022-11-25 RX ADMIN — DEXMEDETOMIDINE HYDROCHLORIDE 1.5 MCG/KG/HR: 4 INJECTION, SOLUTION INTRAVENOUS at 03:39

## 2022-11-25 RX ADMIN — SODIUM CHLORIDE, POTASSIUM CHLORIDE, SODIUM LACTATE AND CALCIUM CHLORIDE: 600; 310; 30; 20 INJECTION, SOLUTION INTRAVENOUS at 16:50

## 2022-11-25 RX ADMIN — SODIUM CHLORIDE 125 ML/HR: 9 INJECTION, SOLUTION INTRAVENOUS at 19:52

## 2022-11-25 RX ADMIN — SODIUM CHLORIDE 500 ML: 9 INJECTION, SOLUTION INTRAVENOUS at 09:00

## 2022-11-25 RX ADMIN — METRONIDAZOLE 500 MG: 500 INJECTION, SOLUTION INTRAVENOUS at 04:10

## 2022-11-25 RX ADMIN — DEXMEDETOMIDINE HYDROCHLORIDE 1.5 MCG/KG/HR: 4 INJECTION, SOLUTION INTRAVENOUS at 10:14

## 2022-11-25 RX ADMIN — Medication 100 MCG: at 19:02

## 2022-11-25 RX ADMIN — PHENYLEPHRINE HYDROCHLORIDE 0.2 MCG/KG/MIN: 10 INJECTION INTRAVENOUS at 16:41

## 2022-11-25 RX ADMIN — CEFEPIME 2 G: 2 INJECTION, POWDER, FOR SOLUTION INTRAVENOUS at 11:53

## 2022-11-25 RX ADMIN — CHLORHEXIDINE GLUCONATE 15 ML: 1.2 RINSE ORAL at 20:25

## 2022-11-25 RX ADMIN — SUCRALFATE 1 G: 1 TABLET ORAL at 20:25

## 2022-11-25 RX ADMIN — CHLORHEXIDINE GLUCONATE 15 ML: 1.2 RINSE ORAL at 10:08

## 2022-11-25 RX ADMIN — DEXMEDETOMIDINE HYDROCHLORIDE 1.5 MCG/KG/HR: 4 INJECTION, SOLUTION INTRAVENOUS at 13:16

## 2022-11-25 RX ADMIN — METRONIDAZOLE 500 MG: 500 INJECTION, SOLUTION INTRAVENOUS at 20:25

## 2022-11-25 RX ADMIN — FENTANYL CITRATE 100 MCG: 50 INJECTION INTRAMUSCULAR; INTRAVENOUS at 16:33

## 2022-11-25 RX ADMIN — CEFEPIME 2 G: 2 INJECTION, POWDER, FOR SOLUTION INTRAVENOUS at 22:25

## 2022-11-25 NOTE — ANESTHESIA PROCEDURE NOTES
Arterial Line      Start time: 11/24/2022 6:55 PM  Stop Time:11/24/2022 7:20 PM       Line placed for ABGs/Labs/ISTAT.  Performed By   CRNA/CAA: Nirav Odonnell CRNA   Preanesthetic Checklist  Completed: patient identified, IV checked, site marked, risks and benefits discussed, surgical consent, monitors and equipment checked, pre-op evaluation and timeout performed  Arterial Line Prep    Sterile Tech: cap, mask and sterile barriers  Prep: alcohol swabs and DuraPrep  Patient monitoring: continuous pulse oximetry, EKG and blood pressure monitoring  Arterial Line Procedure   Laterality:right  Location:  radial artery  Catheter size: 20 G   Guidance: ultrasound guided  PROCEDURE NOTE/ULTRASOUND INTERPRETATION.  Using ultrasound guidance the potential vascular sites for insertion of the catheter were visualized to determine the patency of the vessel to be used for vascular access.  After selecting the appropriate site for insertion, the needle was visualized under ultrasound being inserted into the radial artery, followed by ultrasound confirmation of wire and catheter placement. There were no abnormalities seen on ultrasound; an image was taken; and the patient tolerated the procedure with no complications.   Number of attempts: 2  Successful placement: no   Post Assessment    Complications no

## 2022-11-25 NOTE — ANESTHESIA PREPROCEDURE EVALUATION
Anesthesia Evaluation     Patient summary reviewed and Nursing notes reviewed   NPO Solid Status: > 8 hours  NPO Liquid Status: > 8 hours           Airway   Mallampati: II  TM distance: >3 FB  Neck ROM: full  No difficulty expected  Dental - normal exam     Pulmonary - normal exam   (+) a smoker Current Abstained day of surgery,   Cardiovascular - negative cardio ROS  Exercise tolerance: excellent (>7 METS)    Rhythm: regular  Rate: abnormal        Neuro/Psych  (+) psychiatric history ADHD,    GI/Hepatic/Renal/Endo    (+)   renal disease ARF,     Musculoskeletal (-) negative ROS    Abdominal  - normal exam   Substance History   (+) alcohol use,      OB/GYN negative ob/gyn ROS         Other - negative ROS                       Anesthesia Plan    ASA 3 - emergent     general     intravenous induction     Anesthetic plan, risks, benefits, and alternatives have been provided, discussed and informed consent has been obtained with: patient.    Use of blood products discussed with patient  Consented to blood products.       CODE STATUS:    Level Of Support Discussed With: Patient  Code Status (Patient has no pulse and is not breathing): CPR (Attempt to Resuscitate)  Medical Interventions (Patient has pulse or is breathing): Full Support

## 2022-11-25 NOTE — ANESTHESIA PROCEDURE NOTES
Airway  Urgency: elective    Date/Time: 11/24/2022 6:36 PM  Airway not difficult    General Information and Staff    Patient location during procedure: OR  CRNA/CAA: Nirav Odonnell CRNA    Indications and Patient Condition  Indications for airway management: airway protection    Preoxygenated: yes  Mask difficulty assessment: 0 - not attempted    Final Airway Details  Final airway type: endotracheal airway      Successful airway: ETT  Cuffed: yes   Successful intubation technique: direct laryngoscopy, video laryngoscopy and RSI  Facilitating devices/methods: intubating stylet and cricoid pressure  Endotracheal tube insertion site: oral  Blade: Ruth  Blade size: 4  ETT size (mm): 7.5  Cormack-Lehane Classification: grade I - full view of glottis  Placement verified by: chest auscultation and capnometry   Cuff volume (mL): 7  Measured from: lips  ETT/EBT  to lips (cm): 22  Number of attempts at approach: 1  Assessment: lips, teeth, and gum same as pre-op and atraumatic intubation

## 2022-11-25 NOTE — ANESTHESIA PREPROCEDURE EVALUATION
Anesthesia Evaluation     Patient summary reviewed and Nursing notes reviewed   NPO Solid Status: > 8 hours  NPO Liquid Status: > 8 hours           Airway   Mallampati: II  TM distance: >3 FB  Neck ROM: full  No difficulty expected  Dental - normal exam     Pulmonary - normal exam   (+) a smoker Current Abstained day of surgery,     ROS comment: intubated  Cardiovascular - negative cardio ROS  Exercise tolerance: excellent (>7 METS)    Rhythm: regular  Rate: abnormal        Neuro/Psych  (+) psychiatric history ADHD,    GI/Hepatic/Renal/Endo    (+)   renal disease ARF,     ROS Comment: Admitted with sepsis, found to have bowel obstruction  POD 1 from ex lap, bowel resection  Intubated, on pressors over night, planning dialysis    Musculoskeletal (-) negative ROS    Abdominal  - normal exam   Substance History   (+) alcohol use,      OB/GYN negative ob/gyn ROS         Other - negative ROS           Phys Exam Other: ETT in place                  Anesthesia Plan    ASA 3 - emergent     general     intravenous induction           CODE STATUS:

## 2022-11-26 ENCOUNTER — APPOINTMENT (OUTPATIENT)
Dept: GENERAL RADIOLOGY | Facility: HOSPITAL | Age: 42
End: 2022-11-26

## 2022-11-26 LAB
ALBUMIN SERPL-MCNC: 2.2 G/DL (ref 3.5–5.2)
ALBUMIN SERPL-MCNC: 2.3 G/DL (ref 3.5–5.2)
ALBUMIN/GLOB SERPL: 1.2 G/DL
ALBUMIN/GLOB SERPL: 1.2 G/DL
ALP SERPL-CCNC: 26 U/L (ref 39–117)
ALP SERPL-CCNC: 27 U/L (ref 39–117)
ALT SERPL W P-5'-P-CCNC: 17 U/L (ref 1–41)
ALT SERPL W P-5'-P-CCNC: 17 U/L (ref 1–41)
ANION GAP SERPL CALCULATED.3IONS-SCNC: 3 MMOL/L (ref 5–15)
ANION GAP SERPL CALCULATED.3IONS-SCNC: 3 MMOL/L (ref 5–15)
ANION GAP SERPL CALCULATED.3IONS-SCNC: 4 MMOL/L (ref 5–15)
ANION GAP SERPL CALCULATED.3IONS-SCNC: 5 MMOL/L (ref 5–15)
ANION GAP SERPL CALCULATED.3IONS-SCNC: 6 MMOL/L (ref 5–15)
ARTERIAL PATENCY WRIST A: POSITIVE
AST SERPL-CCNC: 18 U/L (ref 1–40)
AST SERPL-CCNC: 36 U/L (ref 1–40)
ATMOSPHERIC PRESS: 755 MMHG
BASE EXCESS BLDA CALC-SCNC: 4.6 MMOL/L (ref 0–2)
BASOPHILS # BLD AUTO: 0.01 10*3/MM3 (ref 0–0.2)
BASOPHILS NFR BLD AUTO: 0.1 % (ref 0–1.5)
BDY SITE: ABNORMAL
BH BB BLOOD EXPIRATION DATE: NORMAL
BH BB BLOOD TYPE BARCODE: 5100
BH BB DISPENSE STATUS: NORMAL
BH BB PRODUCT CODE: NORMAL
BH BB UNIT NUMBER: NORMAL
BILIRUB SERPL-MCNC: 0.4 MG/DL (ref 0–1.2)
BILIRUB SERPL-MCNC: 0.4 MG/DL (ref 0–1.2)
BODY TEMPERATURE: 37 C
BUN SERPL-MCNC: 30 MG/DL (ref 6–20)
BUN SERPL-MCNC: 30 MG/DL (ref 6–20)
BUN SERPL-MCNC: 34 MG/DL (ref 6–20)
BUN SERPL-MCNC: 34 MG/DL (ref 6–20)
BUN SERPL-MCNC: 38 MG/DL (ref 6–20)
BUN/CREAT SERPL: 31.6 (ref 7–25)
BUN/CREAT SERPL: 33.3 (ref 7–25)
CA-I BLD-MCNC: 4.31 MG/DL (ref 4.6–5.4)
CALCIUM SPEC-SCNC: 7.2 MG/DL (ref 8.6–10.5)
CALCIUM SPEC-SCNC: 7.3 MG/DL (ref 8.6–10.5)
CALCIUM SPEC-SCNC: 7.3 MG/DL (ref 8.6–10.5)
CHLORIDE SERPL-SCNC: 112 MMOL/L (ref 98–107)
CHLORIDE SERPL-SCNC: 114 MMOL/L (ref 98–107)
CHLORIDE SERPL-SCNC: 114 MMOL/L (ref 98–107)
CHLORIDE SERPL-SCNC: 115 MMOL/L (ref 98–107)
CHLORIDE SERPL-SCNC: 115 MMOL/L (ref 98–107)
CHOLEST SERPL-MCNC: 57 MG/DL (ref 0–200)
CHOLEST SERPL-MCNC: 62 MG/DL (ref 0–200)
CO2 SERPL-SCNC: 26 MMOL/L (ref 22–29)
CO2 SERPL-SCNC: 27 MMOL/L (ref 22–29)
CO2 SERPL-SCNC: 29 MMOL/L (ref 22–29)
CO2 SERPL-SCNC: 30 MMOL/L (ref 22–29)
CO2 SERPL-SCNC: 30 MMOL/L (ref 22–29)
CREAT SERPL-MCNC: 0.9 MG/DL (ref 0.76–1.27)
CREAT SERPL-MCNC: 0.95 MG/DL (ref 0.76–1.27)
CREAT SERPL-MCNC: 1.02 MG/DL (ref 0.76–1.27)
CREAT SERPL-MCNC: 1.02 MG/DL (ref 0.76–1.27)
CREAT SERPL-MCNC: 1.14 MG/DL (ref 0.76–1.27)
CROSSMATCH INTERPRETATION: NORMAL
CRP SERPL-MCNC: 17.62 MG/DL (ref 0–0.5)
CRP SERPL-MCNC: 18.8 MG/DL (ref 0–0.5)
DEPRECATED RDW RBC AUTO: 49 FL (ref 37–54)
EGFRCR SERPLBLD CKD-EPI 2021: 101.9 ML/MIN/1.73
EGFRCR SERPLBLD CKD-EPI 2021: 108.7 ML/MIN/1.73
EGFRCR SERPLBLD CKD-EPI 2021: 81.8 ML/MIN/1.73
EGFRCR SERPLBLD CKD-EPI 2021: 93.5 ML/MIN/1.73
EGFRCR SERPLBLD CKD-EPI 2021: 93.5 ML/MIN/1.73
EOSINOPHIL # BLD AUTO: 0.01 10*3/MM3 (ref 0–0.4)
EOSINOPHIL NFR BLD AUTO: 0.1 % (ref 0.3–6.2)
ERYTHROCYTE [DISTWIDTH] IN BLOOD BY AUTOMATED COUNT: 13.8 % (ref 12.3–15.4)
GLOBULIN UR ELPH-MCNC: 1.9 GM/DL
GLOBULIN UR ELPH-MCNC: 1.9 GM/DL
GLUCOSE BLDC GLUCOMTR-MCNC: 88 MG/DL (ref 70–130)
GLUCOSE SERPL-MCNC: 101 MG/DL (ref 65–99)
GLUCOSE SERPL-MCNC: 92 MG/DL (ref 65–99)
GLUCOSE SERPL-MCNC: 92 MG/DL (ref 65–99)
HCO3 BLDA-SCNC: 29.6 MMOL/L (ref 20–26)
HCT VFR BLD AUTO: 24.2 % (ref 37.5–51)
HGB BLD-MCNC: 7.9 G/DL (ref 13–17.7)
IMM GRANULOCYTES # BLD AUTO: 0.06 10*3/MM3 (ref 0–0.05)
IMM GRANULOCYTES NFR BLD AUTO: 0.5 % (ref 0–0.5)
INHALED O2 CONCENTRATION: 30 %
LYMPHOCYTES # BLD AUTO: 1.28 10*3/MM3 (ref 0.7–3.1)
LYMPHOCYTES NFR BLD AUTO: 11.3 % (ref 19.6–45.3)
Lab: ABNORMAL
Lab: ABNORMAL
MAGNESIUM SERPL-MCNC: 1.9 MG/DL (ref 1.6–2.6)
MAGNESIUM SERPL-MCNC: 2 MG/DL (ref 1.6–2.6)
MCH RBC QN AUTO: 31.6 PG (ref 26.6–33)
MCHC RBC AUTO-ENTMCNC: 32.6 G/DL (ref 31.5–35.7)
MCV RBC AUTO: 96.8 FL (ref 79–97)
MODALITY: ABNORMAL
MONOCYTES # BLD AUTO: 0.52 10*3/MM3 (ref 0.1–0.9)
MONOCYTES NFR BLD AUTO: 4.6 % (ref 5–12)
NEUTROPHILS NFR BLD AUTO: 83.4 % (ref 42.7–76)
NEUTROPHILS NFR BLD AUTO: 9.45 10*3/MM3 (ref 1.7–7)
NRBC BLD AUTO-RTO: 0 /100 WBC (ref 0–0.2)
PCO2 BLDA: 45.7 MM HG (ref 35–45)
PCO2 TEMP ADJ BLD: 45.7 MM HG (ref 35–45)
PEEP RESPIRATORY: 5 CM[H2O]
PH BLDA: 7.42 PH UNITS (ref 7.35–7.45)
PH, TEMP CORRECTED: 7.42 PH UNITS (ref 7.35–7.45)
PHOSPHATE SERPL-MCNC: 2.3 MG/DL (ref 2.5–4.5)
PHOSPHATE SERPL-MCNC: 2.3 MG/DL (ref 2.5–4.5)
PLATELET # BLD AUTO: 176 10*3/MM3 (ref 140–450)
PMV BLD AUTO: 9.7 FL (ref 6–12)
PO2 BLDA: 116 MM HG (ref 83–108)
PO2 TEMP ADJ BLD: 116 MM HG (ref 83–108)
POTASSIUM SERPL-SCNC: 4 MMOL/L (ref 3.5–5.2)
POTASSIUM SERPL-SCNC: 4 MMOL/L (ref 3.5–5.2)
POTASSIUM SERPL-SCNC: 4.3 MMOL/L (ref 3.5–5.2)
POTASSIUM SERPL-SCNC: 4.3 MMOL/L (ref 3.5–5.2)
POTASSIUM SERPL-SCNC: 4.4 MMOL/L (ref 3.5–5.2)
PREALB SERPL-MCNC: 8.3 MG/DL (ref 20–40)
PROT SERPL-MCNC: 4.1 G/DL (ref 6–8.5)
PROT SERPL-MCNC: 4.2 G/DL (ref 6–8.5)
RBC # BLD AUTO: 2.5 10*6/MM3 (ref 4.14–5.8)
SAO2 % BLDCOA: 99.2 % (ref 94–99)
SET MECH RESP RATE: 18
SODIUM SERPL-SCNC: 145 MMOL/L (ref 136–145)
SODIUM SERPL-SCNC: 147 MMOL/L (ref 136–145)
TRIGL SERPL-MCNC: 74 MG/DL (ref 0–150)
TRIGL SERPL-MCNC: 81 MG/DL (ref 0–150)
UNIT  ABO: NORMAL
UNIT  RH: NORMAL
URATE SERPL-MCNC: 4.1 MG/DL (ref 3.4–7)
VENTILATOR MODE: AC
VT ON VENT VENT: 550 ML
WBC NRBC COR # BLD: 11.33 10*3/MM3 (ref 3.4–10.8)

## 2022-11-26 PROCEDURE — 25010000002 CEFEPIME PER 500 MG: Performed by: INTERNAL MEDICINE

## 2022-11-26 PROCEDURE — 82962 GLUCOSE BLOOD TEST: CPT

## 2022-11-26 PROCEDURE — 94799 UNLISTED PULMONARY SVC/PX: CPT

## 2022-11-26 PROCEDURE — 36600 WITHDRAWAL OF ARTERIAL BLOOD: CPT

## 2022-11-26 PROCEDURE — 83735 ASSAY OF MAGNESIUM: CPT | Performed by: SPECIALIST

## 2022-11-26 PROCEDURE — 82803 BLOOD GASES ANY COMBINATION: CPT

## 2022-11-26 PROCEDURE — 82465 ASSAY BLD/SERUM CHOLESTEROL: CPT | Performed by: SPECIALIST

## 2022-11-26 PROCEDURE — 99232 SBSQ HOSP IP/OBS MODERATE 35: CPT | Performed by: INTERNAL MEDICINE

## 2022-11-26 PROCEDURE — 85025 COMPLETE CBC W/AUTO DIFF WBC: CPT | Performed by: SPECIALIST

## 2022-11-26 PROCEDURE — C1751 CATH, INF, PER/CENT/MIDLINE: HCPCS

## 2022-11-26 PROCEDURE — 94003 VENT MGMT INPAT SUBQ DAY: CPT

## 2022-11-26 PROCEDURE — 25010000002 ENOXAPARIN PER 10 MG: Performed by: SPECIALIST

## 2022-11-26 PROCEDURE — 71045 X-RAY EXAM CHEST 1 VIEW: CPT

## 2022-11-26 PROCEDURE — 05HY33Z INSERTION OF INFUSION DEVICE INTO UPPER VEIN, PERCUTANEOUS APPROACH: ICD-10-PCS | Performed by: SPECIALIST

## 2022-11-26 PROCEDURE — 25010000002 MORPHINE SULFATE 10 MG/ML SOLUTION: Performed by: FAMILY MEDICINE

## 2022-11-26 PROCEDURE — 80053 COMPREHEN METABOLIC PANEL: CPT | Performed by: SPECIALIST

## 2022-11-26 PROCEDURE — 3E0336Z INTRODUCTION OF NUTRITIONAL SUBSTANCE INTO PERIPHERAL VEIN, PERCUTANEOUS APPROACH: ICD-10-PCS | Performed by: SPECIALIST

## 2022-11-26 PROCEDURE — 84478 ASSAY OF TRIGLYCERIDES: CPT | Performed by: SPECIALIST

## 2022-11-26 PROCEDURE — 84134 ASSAY OF PREALBUMIN: CPT | Performed by: SPECIALIST

## 2022-11-26 PROCEDURE — 86140 C-REACTIVE PROTEIN: CPT | Performed by: SPECIALIST

## 2022-11-26 PROCEDURE — 84100 ASSAY OF PHOSPHORUS: CPT | Performed by: SPECIALIST

## 2022-11-26 PROCEDURE — 84550 ASSAY OF BLOOD/URIC ACID: CPT | Performed by: INTERNAL MEDICINE

## 2022-11-26 PROCEDURE — 25010000002 CEFEPIME PER 500 MG: Performed by: SPECIALIST

## 2022-11-26 PROCEDURE — 94761 N-INVAS EAR/PLS OXIMETRY MLT: CPT

## 2022-11-26 PROCEDURE — 82330 ASSAY OF CALCIUM: CPT

## 2022-11-26 PROCEDURE — 25010000002 MORPHINE SULFATE 10 MG/ML SOLUTION: Performed by: SPECIALIST

## 2022-11-26 RX ORDER — NALOXONE HCL 0.4 MG/ML
0.1 VIAL (ML) INJECTION
Status: DISCONTINUED | OUTPATIENT
Start: 2022-11-26 | End: 2022-12-01 | Stop reason: HOSPADM

## 2022-11-26 RX ORDER — SODIUM CHLORIDE 0.9 % (FLUSH) 0.9 %
10 SYRINGE (ML) INJECTION EVERY 12 HOURS SCHEDULED
Status: DISCONTINUED | OUTPATIENT
Start: 2022-11-26 | End: 2022-11-29

## 2022-11-26 RX ORDER — SODIUM CHLORIDE 0.9 % (FLUSH) 0.9 %
10 SYRINGE (ML) INJECTION AS NEEDED
Status: DISCONTINUED | OUTPATIENT
Start: 2022-11-26 | End: 2022-11-29

## 2022-11-26 RX ORDER — SODIUM CHLORIDE 0.9 % (FLUSH) 0.9 %
20 SYRINGE (ML) INJECTION AS NEEDED
Status: DISCONTINUED | OUTPATIENT
Start: 2022-11-26 | End: 2022-12-01 | Stop reason: HOSPADM

## 2022-11-26 RX ORDER — MORPHINE SULFATE 1 MG/ML
INJECTION INTRAVENOUS CONTINUOUS
Status: DISPENSED | OUTPATIENT
Start: 2022-11-26 | End: 2022-11-29

## 2022-11-26 RX ORDER — LIDOCAINE HYDROCHLORIDE 10 MG/ML
1 INJECTION, SOLUTION EPIDURAL; INFILTRATION; INTRACAUDAL; PERINEURAL ONCE
Status: COMPLETED | OUTPATIENT
Start: 2022-11-26 | End: 2022-11-26

## 2022-11-26 RX ADMIN — PANTOPRAZOLE SODIUM 8 MG/HR: 40 INJECTION, POWDER, FOR SOLUTION INTRAVENOUS at 07:59

## 2022-11-26 RX ADMIN — Medication 10 ML: at 08:54

## 2022-11-26 RX ADMIN — Medication 10 ML: at 20:13

## 2022-11-26 RX ADMIN — CEFEPIME 2 G: 2 INJECTION, POWDER, FOR SOLUTION INTRAVENOUS at 10:40

## 2022-11-26 RX ADMIN — MORPHINE SULFATE 4 MG/HR: 10 INJECTION INTRAVENOUS at 00:48

## 2022-11-26 RX ADMIN — SUCRALFATE 1 G: 1 TABLET ORAL at 07:59

## 2022-11-26 RX ADMIN — CHLORHEXIDINE GLUCONATE 15 ML: 1.2 RINSE ORAL at 20:14

## 2022-11-26 RX ADMIN — SODIUM CHLORIDE 125 ML/HR: 9 INJECTION, SOLUTION INTRAVENOUS at 11:58

## 2022-11-26 RX ADMIN — Medication 0.02 MCG/KG/MIN: at 04:29

## 2022-11-26 RX ADMIN — ENOXAPARIN SODIUM 30 MG: 100 INJECTION SUBCUTANEOUS at 17:19

## 2022-11-26 RX ADMIN — DEXMEDETOMIDINE HYDROCHLORIDE 1.5 MCG/KG/HR: 4 INJECTION, SOLUTION INTRAVENOUS at 00:53

## 2022-11-26 RX ADMIN — LIDOCAINE HYDROCHLORIDE ANHYDROUS 1 ML: 10 INJECTION, SOLUTION INFILTRATION at 14:17

## 2022-11-26 RX ADMIN — SUCRALFATE 1 G: 1 TABLET ORAL at 11:17

## 2022-11-26 RX ADMIN — SUCRALFATE 1 G: 1 TABLET ORAL at 17:53

## 2022-11-26 RX ADMIN — I.V. FAT EMULSION 50 G: 20 EMULSION INTRAVENOUS at 17:20

## 2022-11-26 RX ADMIN — METRONIDAZOLE 500 MG: 500 INJECTION, SOLUTION INTRAVENOUS at 04:34

## 2022-11-26 RX ADMIN — LEUCINE, PHENYLALANINE, LYSINE, METHIONINE, ISOLEUCINE, VALINE, HISTIDINE, THREONINE, TRYPTOPHAN, ALANINE, GLYCINE, ARGININE, PROLINE, SERINE, TYROSINE, SODIUM ACETATE, DIBASIC POTASSIUM PHOSPHATE, MAGNESIUM CHLORIDE, SODIUM CHLORIDE, CALCIUM CHLORIDE, DEXTROSE
365; 280; 290; 200; 300; 290; 240; 210; 90; 1035; 515; 575; 340; 250; 20; 340; 261; 51; 59; 33; 20 INJECTION INTRAVENOUS at 17:19

## 2022-11-26 RX ADMIN — DEXMEDETOMIDINE HYDROCHLORIDE 1.5 MCG/KG/HR: 4 INJECTION, SOLUTION INTRAVENOUS at 04:25

## 2022-11-26 RX ADMIN — METRONIDAZOLE 500 MG: 500 INJECTION, SOLUTION INTRAVENOUS at 17:19

## 2022-11-26 RX ADMIN — ENOXAPARIN SODIUM 30 MG: 100 INJECTION SUBCUTANEOUS at 05:25

## 2022-11-26 RX ADMIN — SUCRALFATE 1 G: 1 TABLET ORAL at 20:14

## 2022-11-26 RX ADMIN — METRONIDAZOLE 500 MG: 500 INJECTION, SOLUTION INTRAVENOUS at 10:40

## 2022-11-26 RX ADMIN — PANTOPRAZOLE SODIUM 8 MG/HR: 40 INJECTION, POWDER, FOR SOLUTION INTRAVENOUS at 02:45

## 2022-11-26 RX ADMIN — SODIUM CHLORIDE 125 ML/HR: 9 INJECTION, SOLUTION INTRAVENOUS at 02:17

## 2022-11-26 RX ADMIN — CHLORHEXIDINE GLUCONATE 15 ML: 1.2 RINSE ORAL at 08:54

## 2022-11-26 RX ADMIN — CEFEPIME 2 G: 2 INJECTION, POWDER, FOR SOLUTION INTRAVENOUS at 23:48

## 2022-11-26 RX ADMIN — MORPHINE SULFATE: 10 INJECTION, SOLUTION INTRAMUSCULAR; INTRAVENOUS at 08:57

## 2022-11-26 NOTE — ANESTHESIA POSTPROCEDURE EVALUATION
Patient: Kevin Gorline    Procedure Summary     Date: 11/25/22 Room / Location: [facility] OR  /  [facility] OR    Anesthesia Start: 1625 Anesthesia Stop: 1936    Procedure: LAPAROTOMY EXPLORATORY RIGHT COLON RESECTION ANASTOMOSIS (Abdomen) Diagnosis:     Surgeons: Yuliya Aranda MD Provider: Michael Solano CRNA    Anesthesia Type: general ASA Status: 3 - Emergent          Anesthesia Type: general    Vitals  No vitals data found for the desired time range.          Post Anesthesia Care and Evaluation    Patient location during evaluation: ICU  Patient participation: complete - patient cannot participate  Post-procedure mental status: intubated and sedated.  Pain score: 0  Pain management: adequate    Airway patency: patent  Anesthetic complications: No anesthetic complications  PONV Status: NA  Cardiovascular status: hemodynamically stable  Respiratory status: intubated and ventilator  Hydration status: acceptable    Comments: Report given to bedside RN, patient hemodynamically stable on phenylephrine drip. Patient is intubated and sedated per surgeon request.  No anesthesia care post op

## 2022-11-27 ENCOUNTER — APPOINTMENT (OUTPATIENT)
Dept: GENERAL RADIOLOGY | Facility: HOSPITAL | Age: 42
End: 2022-11-27

## 2022-11-27 LAB
ALBUMIN SERPL-MCNC: 2.4 G/DL (ref 3.5–5.2)
ALBUMIN/GLOB SERPL: 1.3 G/DL
ALP SERPL-CCNC: 29 U/L (ref 39–117)
ALT SERPL W P-5'-P-CCNC: 15 U/L (ref 1–41)
ANION GAP SERPL CALCULATED.3IONS-SCNC: 5 MMOL/L (ref 5–15)
AST SERPL-CCNC: 14 U/L (ref 1–40)
BASOPHILS # BLD AUTO: 0.02 10*3/MM3 (ref 0–0.2)
BASOPHILS NFR BLD AUTO: 0.2 % (ref 0–1.5)
BILIRUB SERPL-MCNC: 0.2 MG/DL (ref 0–1.2)
BUN SERPL-MCNC: 21 MG/DL (ref 6–20)
BUN/CREAT SERPL: 28.4 (ref 7–25)
CALCIUM SPEC-SCNC: 7.4 MG/DL (ref 8.6–10.5)
CHLORIDE SERPL-SCNC: 112 MMOL/L (ref 98–107)
CO2 SERPL-SCNC: 28 MMOL/L (ref 22–29)
CREAT SERPL-MCNC: 0.74 MG/DL (ref 0.76–1.27)
DEPRECATED RDW RBC AUTO: 48.6 FL (ref 37–54)
EGFRCR SERPLBLD CKD-EPI 2021: 115.3 ML/MIN/1.73
EOSINOPHIL # BLD AUTO: 0.03 10*3/MM3 (ref 0–0.4)
EOSINOPHIL NFR BLD AUTO: 0.3 % (ref 0.3–6.2)
ERYTHROCYTE [DISTWIDTH] IN BLOOD BY AUTOMATED COUNT: 13.9 % (ref 12.3–15.4)
GLOBULIN UR ELPH-MCNC: 1.8 GM/DL
GLUCOSE BLDC GLUCOMTR-MCNC: 123 MG/DL (ref 70–130)
GLUCOSE BLDC GLUCOMTR-MCNC: 143 MG/DL (ref 70–130)
GLUCOSE BLDC GLUCOMTR-MCNC: 149 MG/DL (ref 70–130)
GLUCOSE BLDC GLUCOMTR-MCNC: 152 MG/DL (ref 70–130)
GLUCOSE SERPL-MCNC: 135 MG/DL (ref 65–99)
HCT VFR BLD AUTO: 22.8 % (ref 37.5–51)
HGB BLD-MCNC: 7.5 G/DL (ref 13–17.7)
IMM GRANULOCYTES # BLD AUTO: 0.06 10*3/MM3 (ref 0–0.05)
IMM GRANULOCYTES NFR BLD AUTO: 0.6 % (ref 0–0.5)
LYMPHOCYTES # BLD AUTO: 0.78 10*3/MM3 (ref 0.7–3.1)
LYMPHOCYTES NFR BLD AUTO: 7.5 % (ref 19.6–45.3)
MAGNESIUM SERPL-MCNC: 2.2 MG/DL (ref 1.6–2.6)
MCH RBC QN AUTO: 31.4 PG (ref 26.6–33)
MCHC RBC AUTO-ENTMCNC: 32.9 G/DL (ref 31.5–35.7)
MCV RBC AUTO: 95.4 FL (ref 79–97)
MONOCYTES # BLD AUTO: 0.64 10*3/MM3 (ref 0.1–0.9)
MONOCYTES NFR BLD AUTO: 6.1 % (ref 5–12)
NEUTROPHILS NFR BLD AUTO: 8.91 10*3/MM3 (ref 1.7–7)
NEUTROPHILS NFR BLD AUTO: 85.3 % (ref 42.7–76)
NRBC BLD AUTO-RTO: 0 /100 WBC (ref 0–0.2)
PHOSPHATE SERPL-MCNC: 2.3 MG/DL (ref 2.5–4.5)
PLATELET # BLD AUTO: 198 10*3/MM3 (ref 140–450)
PMV BLD AUTO: 9.3 FL (ref 6–12)
POTASSIUM SERPL-SCNC: 3.7 MMOL/L (ref 3.5–5.2)
PROT SERPL-MCNC: 4.2 G/DL (ref 6–8.5)
RBC # BLD AUTO: 2.39 10*6/MM3 (ref 4.14–5.8)
SODIUM SERPL-SCNC: 145 MMOL/L (ref 136–145)
WBC NRBC COR # BLD: 10.44 10*3/MM3 (ref 3.4–10.8)

## 2022-11-27 PROCEDURE — 71045 X-RAY EXAM CHEST 1 VIEW: CPT

## 2022-11-27 PROCEDURE — 85025 COMPLETE CBC W/AUTO DIFF WBC: CPT | Performed by: SPECIALIST

## 2022-11-27 PROCEDURE — 83735 ASSAY OF MAGNESIUM: CPT | Performed by: SPECIALIST

## 2022-11-27 PROCEDURE — 84100 ASSAY OF PHOSPHORUS: CPT | Performed by: SPECIALIST

## 2022-11-27 PROCEDURE — 80053 COMPREHEN METABOLIC PANEL: CPT | Performed by: SPECIALIST

## 2022-11-27 PROCEDURE — 94799 UNLISTED PULMONARY SVC/PX: CPT

## 2022-11-27 PROCEDURE — 99024 POSTOP FOLLOW-UP VISIT: CPT | Performed by: SPECIALIST

## 2022-11-27 PROCEDURE — 25010000002 MORPHINE SULFATE (PF) 10 MG/ML SOLUTION: Performed by: SPECIALIST

## 2022-11-27 PROCEDURE — 94761 N-INVAS EAR/PLS OXIMETRY MLT: CPT

## 2022-11-27 PROCEDURE — 25010000002 ENOXAPARIN PER 10 MG: Performed by: SPECIALIST

## 2022-11-27 PROCEDURE — 82962 GLUCOSE BLOOD TEST: CPT

## 2022-11-27 RX ORDER — INSULIN LISPRO 100 [IU]/ML
2-7 INJECTION, SOLUTION INTRAVENOUS; SUBCUTANEOUS
Status: DISCONTINUED | OUTPATIENT
Start: 2022-11-27 | End: 2022-11-27

## 2022-11-27 RX ORDER — INSULIN LISPRO 100 [IU]/ML
2-7 INJECTION, SOLUTION INTRAVENOUS; SUBCUTANEOUS
Status: DISCONTINUED | OUTPATIENT
Start: 2022-11-27 | End: 2022-11-28

## 2022-11-27 RX ORDER — NICOTINE POLACRILEX 4 MG
15 LOZENGE BUCCAL
Status: DISCONTINUED | OUTPATIENT
Start: 2022-11-27 | End: 2022-12-01 | Stop reason: HOSPADM

## 2022-11-27 RX ORDER — DEXTROSE MONOHYDRATE 25 G/50ML
25 INJECTION, SOLUTION INTRAVENOUS
Status: DISCONTINUED | OUTPATIENT
Start: 2022-11-27 | End: 2022-12-01 | Stop reason: HOSPADM

## 2022-11-27 RX ADMIN — CHLORHEXIDINE GLUCONATE 15 ML: 1.2 RINSE ORAL at 08:45

## 2022-11-27 RX ADMIN — Medication 10 ML: at 20:19

## 2022-11-27 RX ADMIN — SUCRALFATE 1 G: 1 TABLET ORAL at 08:45

## 2022-11-27 RX ADMIN — Medication 10 ML: at 20:20

## 2022-11-27 RX ADMIN — Medication 10 ML: at 08:46

## 2022-11-27 RX ADMIN — CHLORHEXIDINE GLUCONATE 15 ML: 1.2 RINSE ORAL at 20:19

## 2022-11-27 RX ADMIN — SUCRALFATE 1 G: 1 TABLET ORAL at 12:16

## 2022-11-27 RX ADMIN — Medication 10 ML: at 08:45

## 2022-11-27 RX ADMIN — SUCRALFATE 1 G: 1 TABLET ORAL at 17:44

## 2022-11-27 RX ADMIN — ENOXAPARIN SODIUM 30 MG: 100 INJECTION SUBCUTANEOUS at 05:06

## 2022-11-27 RX ADMIN — I.V. FAT EMULSION 50 G: 20 EMULSION INTRAVENOUS at 17:44

## 2022-11-27 RX ADMIN — MORPHINE SULFATE: 10 INJECTION, SOLUTION INTRAMUSCULAR; INTRAVENOUS at 11:12

## 2022-11-27 RX ADMIN — LEUCINE, PHENYLALANINE, LYSINE, METHIONINE, ISOLEUCINE, VALINE, HISTIDINE, THREONINE, TRYPTOPHAN, ALANINE, GLYCINE, ARGININE, PROLINE, SERINE, TYROSINE, SODIUM ACETATE, DIBASIC POTASSIUM PHOSPHATE, MAGNESIUM CHLORIDE, SODIUM CHLORIDE, CALCIUM CHLORIDE, DEXTROSE
365; 280; 290; 200; 300; 290; 240; 210; 90; 1035; 515; 575; 340; 250; 20; 340; 261; 51; 59; 33; 20 INJECTION INTRAVENOUS at 17:44

## 2022-11-27 RX ADMIN — ENOXAPARIN SODIUM 30 MG: 100 INJECTION SUBCUTANEOUS at 17:44

## 2022-11-27 RX ADMIN — SUCRALFATE 1 G: 1 TABLET ORAL at 20:19

## 2022-11-28 LAB
ALBUMIN SERPL ELPH-MCNC: 2.8 G/DL (ref 2.9–4.4)
ALBUMIN SERPL-MCNC: 2.6 G/DL (ref 3.5–5.2)
ALBUMIN/GLOB SERPL: 1.4 G/DL
ALBUMIN/GLOB SERPL: 1.5 {RATIO} (ref 0.7–1.7)
ALP SERPL-CCNC: 33 U/L (ref 39–117)
ALPHA1 GLOB SERPL ELPH-MCNC: 0.2 G/DL (ref 0–0.4)
ALPHA2 GLOB SERPL ELPH-MCNC: 0.5 G/DL (ref 0.4–1)
ALT SERPL W P-5'-P-CCNC: 17 U/L (ref 1–41)
ANION GAP SERPL CALCULATED.3IONS-SCNC: 3 MMOL/L (ref 5–15)
AST SERPL-CCNC: 12 U/L (ref 1–40)
B-GLOBULIN SERPL ELPH-MCNC: 0.5 G/DL (ref 0.7–1.3)
BACTERIA SPEC AEROBE CULT: NORMAL
BACTERIA SPEC AEROBE CULT: NORMAL
BASOPHILS # BLD AUTO: 0.02 10*3/MM3 (ref 0–0.2)
BASOPHILS NFR BLD AUTO: 0.2 % (ref 0–1.5)
BILIRUB SERPL-MCNC: 0.2 MG/DL (ref 0–1.2)
BUN SERPL-MCNC: 18 MG/DL (ref 6–20)
BUN/CREAT SERPL: 28.6 (ref 7–25)
C-ANCA TITR SER IF: NORMAL TITER
CA-I BLD-MCNC: 4.51 MG/DL (ref 4.6–5.4)
CALCIUM SPEC-SCNC: 7.7 MG/DL (ref 8.6–10.5)
CENTROMERE B AB SER-ACNC: <0.2 AI (ref 0–0.9)
CHLORIDE SERPL-SCNC: 114 MMOL/L (ref 98–107)
CHROMATIN AB SERPL-ACNC: <0.2 AI (ref 0–0.9)
CO2 SERPL-SCNC: 29 MMOL/L (ref 22–29)
CREAT SERPL-MCNC: 0.63 MG/DL (ref 0.76–1.27)
CRP SERPL-MCNC: 9.23 MG/DL (ref 0–0.5)
CYTO UR: NORMAL
DEPRECATED RDW RBC AUTO: 49.8 FL (ref 37–54)
DSDNA AB SER-ACNC: <1 IU/ML (ref 0–9)
EGFRCR SERPLBLD CKD-EPI 2021: 121 ML/MIN/1.73
ENA JO1 AB SER-ACNC: <0.2 AI (ref 0–0.9)
ENA RNP AB SER-ACNC: 0.7 AI (ref 0–0.9)
ENA SCL70 AB SER-ACNC: <0.2 AI (ref 0–0.9)
ENA SM AB SER-ACNC: <0.2 AI (ref 0–0.9)
ENA SS-A AB SER-ACNC: <0.2 AI (ref 0–0.9)
ENA SS-B AB SER-ACNC: <0.2 AI (ref 0–0.9)
EOSINOPHIL # BLD AUTO: 0.15 10*3/MM3 (ref 0–0.4)
EOSINOPHIL NFR BLD AUTO: 1.5 % (ref 0.3–6.2)
ERYTHROCYTE [DISTWIDTH] IN BLOOD BY AUTOMATED COUNT: 14 % (ref 12.3–15.4)
GAMMA GLOB SERPL ELPH-MCNC: 0.6 G/DL (ref 0.4–1.8)
GLOBULIN SER CALC-MCNC: 1.9 G/DL (ref 2.2–3.9)
GLOBULIN UR ELPH-MCNC: 1.9 GM/DL
GLUCOSE BLDC GLUCOMTR-MCNC: 130 MG/DL (ref 70–130)
GLUCOSE BLDC GLUCOMTR-MCNC: 135 MG/DL (ref 70–130)
GLUCOSE BLDC GLUCOMTR-MCNC: 146 MG/DL (ref 70–130)
GLUCOSE SERPL-MCNC: 145 MG/DL (ref 65–99)
HCT VFR BLD AUTO: 23.3 % (ref 37.5–51)
HGB BLD-MCNC: 7.2 G/DL (ref 13–17.7)
IMM GRANULOCYTES # BLD AUTO: 0.07 10*3/MM3 (ref 0–0.05)
IMM GRANULOCYTES NFR BLD AUTO: 0.7 % (ref 0–0.5)
INR PPP: 1.3 (ref 0.91–1.09)
LAB AP CASE REPORT: NORMAL
LABORATORY COMMENT REPORT: ABNORMAL
LYMPHOCYTES # BLD AUTO: 1.11 10*3/MM3 (ref 0.7–3.1)
LYMPHOCYTES NFR BLD AUTO: 11.4 % (ref 19.6–45.3)
Lab: ABNORMAL
Lab: NORMAL
Lab: NORMAL
M PROTEIN SERPL ELPH-MCNC: ABNORMAL G/DL
MAGNESIUM SERPL-MCNC: 2.2 MG/DL (ref 1.6–2.6)
MCH RBC QN AUTO: 30.4 PG (ref 26.6–33)
MCHC RBC AUTO-ENTMCNC: 30.9 G/DL (ref 31.5–35.7)
MCV RBC AUTO: 98.3 FL (ref 79–97)
MONOCYTES # BLD AUTO: 0.73 10*3/MM3 (ref 0.1–0.9)
MONOCYTES NFR BLD AUTO: 7.5 % (ref 5–12)
MYELOPEROXIDASE AB SER IA-ACNC: <0.2 UNITS (ref 0–0.9)
NEUTROPHILS NFR BLD AUTO: 7.63 10*3/MM3 (ref 1.7–7)
NEUTROPHILS NFR BLD AUTO: 78.7 % (ref 42.7–76)
NRBC BLD AUTO-RTO: 0 /100 WBC (ref 0–0.2)
P-ANCA ATYPICAL TITR SER IF: NORMAL TITER
P-ANCA TITR SER IF: NORMAL TITER
PATH REPORT.FINAL DX SPEC: NORMAL
PATH REPORT.GROSS SPEC: NORMAL
PHOSPHATE SERPL-MCNC: 2.3 MG/DL (ref 2.5–4.5)
PLATELET # BLD AUTO: 253 10*3/MM3 (ref 140–450)
PMV BLD AUTO: 9.3 FL (ref 6–12)
POTASSIUM SERPL-SCNC: 3.4 MMOL/L (ref 3.5–5.2)
PROT PATTERN SERPL ELPH-IMP: ABNORMAL
PROT SERPL-MCNC: 4.5 G/DL (ref 6–8.5)
PROT SERPL-MCNC: 4.7 G/DL (ref 6–8.5)
PROTEINASE3 AB SER IA-ACNC: 0.5 UNITS (ref 0–0.9)
PROTHROMBIN TIME: 15.7 SECONDS (ref 11.9–14.6)
QT INTERVAL: 466 MS
QTC INTERVAL: 407 MS
RBC # BLD AUTO: 2.37 10*6/MM3 (ref 4.14–5.8)
SODIUM SERPL-SCNC: 146 MMOL/L (ref 136–145)
TRIGL SERPL-MCNC: 108 MG/DL (ref 0–150)
WBC NRBC COR # BLD: 9.71 10*3/MM3 (ref 3.4–10.8)

## 2022-11-28 PROCEDURE — 83735 ASSAY OF MAGNESIUM: CPT | Performed by: SPECIALIST

## 2022-11-28 PROCEDURE — 84478 ASSAY OF TRIGLYCERIDES: CPT | Performed by: SPECIALIST

## 2022-11-28 PROCEDURE — 84134 ASSAY OF PREALBUMIN: CPT | Performed by: SPECIALIST

## 2022-11-28 PROCEDURE — 82330 ASSAY OF CALCIUM: CPT

## 2022-11-28 PROCEDURE — 85025 COMPLETE CBC W/AUTO DIFF WBC: CPT | Performed by: SPECIALIST

## 2022-11-28 PROCEDURE — 82962 GLUCOSE BLOOD TEST: CPT

## 2022-11-28 PROCEDURE — 86140 C-REACTIVE PROTEIN: CPT | Performed by: SPECIALIST

## 2022-11-28 PROCEDURE — 80053 COMPREHEN METABOLIC PANEL: CPT | Performed by: SPECIALIST

## 2022-11-28 PROCEDURE — 25010000002 ENOXAPARIN PER 10 MG: Performed by: SPECIALIST

## 2022-11-28 PROCEDURE — 85610 PROTHROMBIN TIME: CPT | Performed by: SPECIALIST

## 2022-11-28 PROCEDURE — 84100 ASSAY OF PHOSPHORUS: CPT | Performed by: SPECIALIST

## 2022-11-28 PROCEDURE — 99024 POSTOP FOLLOW-UP VISIT: CPT | Performed by: SPECIALIST

## 2022-11-28 PROCEDURE — 94799 UNLISTED PULMONARY SVC/PX: CPT

## 2022-11-28 RX ORDER — INSULIN LISPRO 100 [IU]/ML
2-7 INJECTION, SOLUTION INTRAVENOUS; SUBCUTANEOUS EVERY 6 HOURS
Status: DISCONTINUED | OUTPATIENT
Start: 2022-11-28 | End: 2022-12-01 | Stop reason: HOSPADM

## 2022-11-28 RX ADMIN — Medication 10 ML: at 20:02

## 2022-11-28 RX ADMIN — I.V. FAT EMULSION 50 G: 20 EMULSION INTRAVENOUS at 18:41

## 2022-11-28 RX ADMIN — ASCORBIC ACID, VITAMIN A PALMITATE, CHOLECALCIFEROL, THIAMINE HYDROCHLORIDE, RIBOFLAVIN-5 PHOSPHATE SODIUM, PYRIDOXINE HYDROCHLORIDE, NIACINAMIDE, DEXPANTHENOL, ALPHA-TOCOPHEROL ACETATE, VITAMIN K1, FOLIC ACID, BIOTIN, CYANOCOBALAMIN: 200; 3300; 200; 6; 3.6; 6; 40; 15; 10; 150; 600; 60; 5 INJECTION, SOLUTION INTRAVENOUS at 18:41

## 2022-11-28 RX ADMIN — ENOXAPARIN SODIUM 30 MG: 100 INJECTION SUBCUTANEOUS at 18:41

## 2022-11-28 RX ADMIN — SUCRALFATE 1 G: 1 TABLET ORAL at 18:41

## 2022-11-28 RX ADMIN — ENOXAPARIN SODIUM 30 MG: 100 INJECTION SUBCUTANEOUS at 05:27

## 2022-11-28 RX ADMIN — Medication 10 ML: at 08:46

## 2022-11-28 RX ADMIN — SUCRALFATE 1 G: 1 TABLET ORAL at 08:45

## 2022-11-28 RX ADMIN — CHLORHEXIDINE GLUCONATE 15 ML: 1.2 RINSE ORAL at 20:02

## 2022-11-28 RX ADMIN — CHLORHEXIDINE GLUCONATE 15 ML: 1.2 RINSE ORAL at 08:45

## 2022-11-28 RX ADMIN — SUCRALFATE 1 G: 1 TABLET ORAL at 12:44

## 2022-11-28 RX ADMIN — SUCRALFATE 1 G: 1 TABLET ORAL at 20:01

## 2022-11-28 NOTE — ANESTHESIA POSTPROCEDURE EVALUATION
"Patient: Kevin Gorline    Procedure Summary     Date: 11/24/22 Room / Location:  [facility] OR  /  [facility] OR    Anesthesia Start: 1828 Anesthesia Stop: 2000    Procedure: LAPAROTOMY EXPLORATORY, SMALL BOWEL RESECTION (Abdomen) Diagnosis:     Surgeons: Yuliya Aranda MD Provider: Nirav Odonnell CRNA    Anesthesia Type: general ASA Status: 3 - Emergent          Anesthesia Type: general    Vitals  Vitals Value Taken Time   /84 11/28/22 0601   Temp 98 °F (36.7 °C) 11/28/22 0400   Pulse 60 11/28/22 0651   Resp 16 11/28/22 0600   SpO2 95 % 11/28/22 0400   Vitals shown include unvalidated device data.        Post Anesthesia Care and Evaluation    Patient location during evaluation: PACU  Patient participation: complete - patient participated  Level of consciousness: awake and alert  Pain management: adequate    Airway patency: patent  Anesthetic complications: No anesthetic complications    Cardiovascular status: acceptable  Respiratory status: acceptable  Hydration status: acceptable    Comments: Blood pressure 153/84, pulse 72, temperature 98 °F (36.7 °C), temperature source Oral, resp. rate 16, height 182.9 cm (72\"), weight 89.9 kg (198 lb 3.2 oz), SpO2 95 %.    Pt discharged from PACU based on nela score >8      "

## 2022-11-29 LAB
ALBUMIN SERPL-MCNC: 2.6 G/DL (ref 3.5–5.2)
ALBUMIN/GLOB SERPL: 1.2 G/DL
ALP SERPL-CCNC: 40 U/L (ref 39–117)
ALT SERPL W P-5'-P-CCNC: 16 U/L (ref 1–41)
ANION GAP SERPL CALCULATED.3IONS-SCNC: 6 MMOL/L (ref 5–15)
AST SERPL-CCNC: 11 U/L (ref 1–40)
BASOPHILS # BLD AUTO: 0.02 10*3/MM3 (ref 0–0.2)
BASOPHILS NFR BLD AUTO: 0.1 % (ref 0–1.5)
BILIRUB SERPL-MCNC: 0.3 MG/DL (ref 0–1.2)
BUN SERPL-MCNC: 18 MG/DL (ref 6–20)
BUN/CREAT SERPL: 29.5 (ref 7–25)
CALCIUM SPEC-SCNC: 8 MG/DL (ref 8.6–10.5)
CHLORIDE SERPL-SCNC: 110 MMOL/L (ref 98–107)
CO2 SERPL-SCNC: 26 MMOL/L (ref 22–29)
CREAT SERPL-MCNC: 0.61 MG/DL (ref 0.76–1.27)
DEPRECATED RDW RBC AUTO: 49.2 FL (ref 37–54)
EGFRCR SERPLBLD CKD-EPI 2021: 122.2 ML/MIN/1.73
EOSINOPHIL # BLD AUTO: 0.1 10*3/MM3 (ref 0–0.4)
EOSINOPHIL NFR BLD AUTO: 0.7 % (ref 0.3–6.2)
ERYTHROCYTE [DISTWIDTH] IN BLOOD BY AUTOMATED COUNT: 13.8 % (ref 12.3–15.4)
GLOBULIN UR ELPH-MCNC: 2.1 GM/DL
GLUCOSE BLDC GLUCOMTR-MCNC: 124 MG/DL (ref 70–130)
GLUCOSE BLDC GLUCOMTR-MCNC: 145 MG/DL (ref 70–130)
GLUCOSE BLDC GLUCOMTR-MCNC: 151 MG/DL (ref 70–130)
GLUCOSE SERPL-MCNC: 139 MG/DL (ref 65–99)
HCT VFR BLD AUTO: 25.3 % (ref 37.5–51)
HGB BLD-MCNC: 8 G/DL (ref 13–17.7)
IMM GRANULOCYTES # BLD AUTO: 0.15 10*3/MM3 (ref 0–0.05)
IMM GRANULOCYTES NFR BLD AUTO: 1 % (ref 0–0.5)
LYMPHOCYTES # BLD AUTO: 0.85 10*3/MM3 (ref 0.7–3.1)
LYMPHOCYTES NFR BLD AUTO: 5.9 % (ref 19.6–45.3)
MAGNESIUM SERPL-MCNC: 2.1 MG/DL (ref 1.6–2.6)
MCH RBC QN AUTO: 30.9 PG (ref 26.6–33)
MCHC RBC AUTO-ENTMCNC: 31.6 G/DL (ref 31.5–35.7)
MCV RBC AUTO: 97.7 FL (ref 79–97)
MONOCYTES # BLD AUTO: 1.28 10*3/MM3 (ref 0.1–0.9)
MONOCYTES NFR BLD AUTO: 8.9 % (ref 5–12)
NEUTROPHILS NFR BLD AUTO: 12.01 10*3/MM3 (ref 1.7–7)
NEUTROPHILS NFR BLD AUTO: 83.4 % (ref 42.7–76)
NRBC BLD AUTO-RTO: 0 /100 WBC (ref 0–0.2)
PHOSPHATE SERPL-MCNC: 3.2 MG/DL (ref 2.5–4.5)
PLATELET # BLD AUTO: 283 10*3/MM3 (ref 140–450)
PMV BLD AUTO: 8.9 FL (ref 6–12)
POTASSIUM SERPL-SCNC: 3.9 MMOL/L (ref 3.5–5.2)
PREALB SERPL-MCNC: 10.6 MG/DL (ref 20–40)
PROT SERPL-MCNC: 4.7 G/DL (ref 6–8.5)
RBC # BLD AUTO: 2.59 10*6/MM3 (ref 4.14–5.8)
SODIUM SERPL-SCNC: 142 MMOL/L (ref 136–145)
WBC NRBC COR # BLD: 14.41 10*3/MM3 (ref 3.4–10.8)

## 2022-11-29 PROCEDURE — 83735 ASSAY OF MAGNESIUM: CPT | Performed by: SPECIALIST

## 2022-11-29 PROCEDURE — 80053 COMPREHEN METABOLIC PANEL: CPT | Performed by: SPECIALIST

## 2022-11-29 PROCEDURE — 25010000002 ENOXAPARIN PER 10 MG: Performed by: SPECIALIST

## 2022-11-29 PROCEDURE — 84100 ASSAY OF PHOSPHORUS: CPT | Performed by: SPECIALIST

## 2022-11-29 PROCEDURE — 99024 POSTOP FOLLOW-UP VISIT: CPT | Performed by: SPECIALIST

## 2022-11-29 PROCEDURE — 97165 OT EVAL LOW COMPLEX 30 MIN: CPT

## 2022-11-29 PROCEDURE — 63710000001 INSULIN LISPRO (HUMAN) PER 5 UNITS: Performed by: INTERNAL MEDICINE

## 2022-11-29 PROCEDURE — 82962 GLUCOSE BLOOD TEST: CPT

## 2022-11-29 PROCEDURE — 85025 COMPLETE CBC W/AUTO DIFF WBC: CPT | Performed by: SPECIALIST

## 2022-11-29 PROCEDURE — 97161 PT EVAL LOW COMPLEX 20 MIN: CPT | Performed by: PHYSICAL THERAPIST

## 2022-11-29 RX ADMIN — I.V. FAT EMULSION 50 G: 20 EMULSION INTRAVENOUS at 17:00

## 2022-11-29 RX ADMIN — SUCRALFATE 1 G: 1 TABLET ORAL at 12:38

## 2022-11-29 RX ADMIN — SUCRALFATE 1 G: 1 TABLET ORAL at 08:33

## 2022-11-29 RX ADMIN — Medication 10 ML: at 08:41

## 2022-11-29 RX ADMIN — LEUCINE, PHENYLALANINE, LYSINE, METHIONINE, ISOLEUCINE, VALINE, HISTIDINE, THREONINE, TRYPTOPHAN, ALANINE, GLYCINE, ARGININE, PROLINE, SERINE, TYROSINE, SODIUM ACETATE, DIBASIC POTASSIUM PHOSPHATE, MAGNESIUM CHLORIDE, SODIUM CHLORIDE, CALCIUM CHLORIDE, DEXTROSE
365; 280; 290; 200; 300; 290; 240; 210; 90; 1035; 515; 575; 340; 250; 20; 340; 261; 51; 59; 33; 20 INJECTION INTRAVENOUS at 17:00

## 2022-11-29 RX ADMIN — CHLORHEXIDINE GLUCONATE 15 ML: 1.2 RINSE ORAL at 21:15

## 2022-11-29 RX ADMIN — CHLORHEXIDINE GLUCONATE 15 ML: 1.2 RINSE ORAL at 08:41

## 2022-11-29 RX ADMIN — SUCRALFATE 1 G: 1 TABLET ORAL at 16:45

## 2022-11-29 RX ADMIN — Medication 10 ML: at 21:16

## 2022-11-29 RX ADMIN — SUCRALFATE 1 G: 1 TABLET ORAL at 21:15

## 2022-11-29 RX ADMIN — INSULIN LISPRO 2 UNITS: 100 INJECTION, SOLUTION INTRAVENOUS; SUBCUTANEOUS at 12:36

## 2022-11-29 RX ADMIN — ENOXAPARIN SODIUM 30 MG: 100 INJECTION SUBCUTANEOUS at 17:00

## 2022-11-29 RX ADMIN — ENOXAPARIN SODIUM 30 MG: 100 INJECTION SUBCUTANEOUS at 05:07

## 2022-11-29 RX ADMIN — Medication 10 ML: at 08:44

## 2022-11-30 LAB
ANION GAP SERPL CALCULATED.3IONS-SCNC: 7 MMOL/L (ref 5–15)
BUN SERPL-MCNC: 20 MG/DL (ref 6–20)
BUN/CREAT SERPL: 30.8 (ref 7–25)
CALCIUM SPEC-SCNC: 7.8 MG/DL (ref 8.6–10.5)
CHLORIDE SERPL-SCNC: 109 MMOL/L (ref 98–107)
CO2 SERPL-SCNC: 24 MMOL/L (ref 22–29)
CREAT SERPL-MCNC: 0.65 MG/DL (ref 0.76–1.27)
EGFRCR SERPLBLD CKD-EPI 2021: 119.9 ML/MIN/1.73
GLUCOSE BLDC GLUCOMTR-MCNC: 110 MG/DL (ref 70–130)
GLUCOSE BLDC GLUCOMTR-MCNC: 120 MG/DL (ref 70–130)
GLUCOSE BLDC GLUCOMTR-MCNC: 127 MG/DL (ref 70–130)
GLUCOSE BLDC GLUCOMTR-MCNC: 129 MG/DL (ref 70–130)
GLUCOSE BLDC GLUCOMTR-MCNC: 147 MG/DL (ref 70–130)
GLUCOSE BLDC GLUCOMTR-MCNC: 93 MG/DL (ref 70–130)
GLUCOSE SERPL-MCNC: 133 MG/DL (ref 65–99)
MAGNESIUM SERPL-MCNC: 2 MG/DL (ref 1.6–2.6)
PHOSPHATE SERPL-MCNC: 3.5 MG/DL (ref 2.5–4.5)
POTASSIUM SERPL-SCNC: 3.5 MMOL/L (ref 3.5–5.2)
SODIUM SERPL-SCNC: 140 MMOL/L (ref 136–145)

## 2022-11-30 PROCEDURE — 25010000002 ENOXAPARIN PER 10 MG: Performed by: FAMILY MEDICINE

## 2022-11-30 PROCEDURE — 83735 ASSAY OF MAGNESIUM: CPT | Performed by: FAMILY MEDICINE

## 2022-11-30 PROCEDURE — 80048 BASIC METABOLIC PNL TOTAL CA: CPT | Performed by: FAMILY MEDICINE

## 2022-11-30 PROCEDURE — 97116 GAIT TRAINING THERAPY: CPT

## 2022-11-30 PROCEDURE — 84100 ASSAY OF PHOSPHORUS: CPT | Performed by: FAMILY MEDICINE

## 2022-11-30 PROCEDURE — 99024 POSTOP FOLLOW-UP VISIT: CPT | Performed by: SPECIALIST

## 2022-11-30 PROCEDURE — 82962 GLUCOSE BLOOD TEST: CPT

## 2022-11-30 RX ORDER — OXYCODONE HYDROCHLORIDE AND ACETAMINOPHEN 5; 325 MG/1; MG/1
1 TABLET ORAL EVERY 4 HOURS PRN
Status: DISCONTINUED | OUTPATIENT
Start: 2022-11-30 | End: 2022-12-01 | Stop reason: HOSPADM

## 2022-11-30 RX ADMIN — I.V. FAT EMULSION 50 G: 20 EMULSION INTRAVENOUS at 17:36

## 2022-11-30 RX ADMIN — SUCRALFATE 1 G: 1 TABLET ORAL at 17:34

## 2022-11-30 RX ADMIN — SUCRALFATE 1 G: 1 TABLET ORAL at 11:39

## 2022-11-30 RX ADMIN — CHLORHEXIDINE GLUCONATE 15 ML: 1.2 RINSE ORAL at 09:00

## 2022-11-30 RX ADMIN — ENOXAPARIN SODIUM 30 MG: 100 INJECTION SUBCUTANEOUS at 17:34

## 2022-11-30 RX ADMIN — ACETAMINOPHEN 650 MG: 325 TABLET ORAL at 15:37

## 2022-11-30 RX ADMIN — OXYCODONE HYDROCHLORIDE AND ACETAMINOPHEN 1 TABLET: 5; 325 TABLET ORAL at 16:20

## 2022-11-30 RX ADMIN — SUCRALFATE 1 G: 1 TABLET ORAL at 08:59

## 2022-11-30 RX ADMIN — CHLORHEXIDINE GLUCONATE 15 ML: 1.2 RINSE ORAL at 20:48

## 2022-11-30 RX ADMIN — Medication 10 ML: at 09:00

## 2022-11-30 RX ADMIN — ENOXAPARIN SODIUM 30 MG: 100 INJECTION SUBCUTANEOUS at 06:00

## 2022-11-30 RX ADMIN — Medication 10 ML: at 20:48

## 2022-11-30 RX ADMIN — SUCRALFATE 1 G: 1 TABLET ORAL at 20:48

## 2022-11-30 RX ADMIN — ASCORBIC ACID, VITAMIN A PALMITATE, CHOLECALCIFEROL, THIAMINE HYDROCHLORIDE, RIBOFLAVIN-5 PHOSPHATE SODIUM, PYRIDOXINE HYDROCHLORIDE, NIACINAMIDE, DEXPANTHENOL, ALPHA-TOCOPHEROL ACETATE, VITAMIN K1, FOLIC ACID, BIOTIN, CYANOCOBALAMIN: 200; 3300; 200; 6; 3.6; 6; 40; 15; 10; 150; 600; 60; 5 INJECTION, SOLUTION INTRAVENOUS at 17:36

## 2022-12-01 ENCOUNTER — READMISSION MANAGEMENT (OUTPATIENT)
Dept: CALL CENTER | Facility: HOSPITAL | Age: 42
End: 2022-12-01

## 2022-12-01 VITALS
RESPIRATION RATE: 18 BRPM | BODY MASS INDEX: 24.67 KG/M2 | TEMPERATURE: 98.5 F | WEIGHT: 182.13 LBS | DIASTOLIC BLOOD PRESSURE: 63 MMHG | SYSTOLIC BLOOD PRESSURE: 135 MMHG | HEART RATE: 82 BPM | HEIGHT: 72 IN | OXYGEN SATURATION: 100 %

## 2022-12-01 LAB
ANION GAP SERPL CALCULATED.3IONS-SCNC: 9 MMOL/L (ref 5–15)
BUN SERPL-MCNC: 19 MG/DL (ref 6–20)
BUN/CREAT SERPL: 28.8 (ref 7–25)
CALCIUM SPEC-SCNC: 7.8 MG/DL (ref 8.6–10.5)
CHLORIDE SERPL-SCNC: 107 MMOL/L (ref 98–107)
CO2 SERPL-SCNC: 24 MMOL/L (ref 22–29)
CREAT SERPL-MCNC: 0.66 MG/DL (ref 0.76–1.27)
EGFRCR SERPLBLD CKD-EPI 2021: 119.4 ML/MIN/1.73
GLUCOSE BLDC GLUCOMTR-MCNC: 126 MG/DL (ref 70–130)
GLUCOSE BLDC GLUCOMTR-MCNC: 127 MG/DL (ref 70–130)
GLUCOSE SERPL-MCNC: 115 MG/DL (ref 65–99)
MAGNESIUM SERPL-MCNC: 2.3 MG/DL (ref 1.6–2.6)
PHOSPHATE SERPL-MCNC: 4.7 MG/DL (ref 2.5–4.5)
POTASSIUM SERPL-SCNC: 3.9 MMOL/L (ref 3.5–5.2)
SODIUM SERPL-SCNC: 140 MMOL/L (ref 136–145)

## 2022-12-01 PROCEDURE — 82962 GLUCOSE BLOOD TEST: CPT

## 2022-12-01 PROCEDURE — 80048 BASIC METABOLIC PNL TOTAL CA: CPT | Performed by: FAMILY MEDICINE

## 2022-12-01 PROCEDURE — 25010000002 ENOXAPARIN PER 10 MG: Performed by: FAMILY MEDICINE

## 2022-12-01 PROCEDURE — 83735 ASSAY OF MAGNESIUM: CPT | Performed by: FAMILY MEDICINE

## 2022-12-01 PROCEDURE — 84100 ASSAY OF PHOSPHORUS: CPT | Performed by: FAMILY MEDICINE

## 2022-12-01 RX ORDER — ONDANSETRON 4 MG/1
4 TABLET, FILM COATED ORAL EVERY 6 HOURS PRN
Qty: 10 TABLET | Refills: 0 | Status: SHIPPED | OUTPATIENT
Start: 2022-12-01

## 2022-12-01 RX ORDER — ACETAMINOPHEN 325 MG/1
650 TABLET ORAL EVERY 4 HOURS PRN
Start: 2022-12-01

## 2022-12-01 RX ORDER — HYDROCODONE BITARTRATE AND ACETAMINOPHEN 7.5; 325 MG/1; MG/1
1 TABLET ORAL EVERY 4 HOURS PRN
Qty: 30 TABLET | Refills: 0 | Status: SHIPPED | OUTPATIENT
Start: 2022-12-01

## 2022-12-01 RX ADMIN — SUCRALFATE 1 G: 1 TABLET ORAL at 08:56

## 2022-12-01 RX ADMIN — SUCRALFATE 1 G: 1 TABLET ORAL at 11:58

## 2022-12-01 RX ADMIN — ENOXAPARIN SODIUM 30 MG: 100 INJECTION SUBCUTANEOUS at 05:10

## 2022-12-01 RX ADMIN — Medication 10 ML: at 08:56

## 2022-12-01 RX ADMIN — OXYCODONE HYDROCHLORIDE AND ACETAMINOPHEN 1 TABLET: 5; 325 TABLET ORAL at 15:54

## 2022-12-01 RX ADMIN — OXYCODONE HYDROCHLORIDE AND ACETAMINOPHEN 1 TABLET: 5; 325 TABLET ORAL at 09:30

## 2022-12-01 NOTE — OUTREACH NOTE
Prep Survey    Flowsheet Row Responses   [facility] facility patient discharged from? [facility]   Is LACE score < 7 ? No   Emergency Room discharge w/ pulse ox? No   Eligibility Readm Mgmt   Discharge diagnosis Sepsis associated hypotension    Does the patient have one of the following disease processes/diagnoses(primary or secondary)? Sepsis   Does the patient have Home health ordered? No   Is there a DME ordered? No   Prep survey completed? Yes          MORE BRANCH - Registered Nurse

## 2022-12-01 NOTE — DISCHARGE SUMMARY
[facility] Medicine Services  DISCHARGE SUMMARY       Date of Admission: 11/23/2022  Date of Discharge:  12/1/2022  Primary Care Physician: Brynn Crum APRN    Presenting Problem/History of Present Illness:  Abdominal pain    Final Discharge Diagnoses:  Active Hospital Problems    Diagnosis    • **Sepsis associated hypotension (HCC)    • Abdominal pain, unspecified abdominal location    • Acute gastroenteritis    • Ascites    • Hyperglycemia    • Hematuria    • ROGELIO (acute kidney injury) (HCC)        Consults:   General surgery  ID   Pulmonary  Nephrology  Vascular surgery    Procedures Performed:   Dr. Aranda 11/24: LAPAROTOMY EXPLORATORY, SMALL BOWEL RESECTION    Dr. Aranda 11/25: LAPAROTOMY EXPLORATORY RIGHT COLON RESECTION ANASTOMOSIS      Pertinent Test Results:   Results for orders placed during the hospital encounter of 11/23/22    Adult Transthoracic Echo Complete W/ Cont if Necessary Per Protocol    Interpretation Summary  •  Left ventricular systolic function is normal. Left ventricular ejection fraction appears to be 66 - 70%.  •  Left ventricular diastolic function was normal.  •  Normal right ventricular cavity size and systolic function noted.  •  No evidence of pulmonary hypertension is present.  •  No hemodynamically significant valvular disease.      Imaging Results (All)     Procedure Component Value Units Date/Time    XR Chest 1 View [[id number]] Collected: 11/27/22 0941     Updated: 11/27/22 0945    Narrative:      EXAMINATION: XR CHEST 1 VW- 11/27/2022 9:41 AM CST     HISTORY: Intubated Patient; R10.9-Unspecified abdominal pain;  R65.10-Systemic inflammatory response syndrome (sirs) of non-infectious  origin without acute organ dysfunction; R94.31-Abnormal  electrocardiogram (ECG) (EKG); K52.9-Noninfective gastroenteritis and  colitis, unspecified; E87.20-Acidosis, unspecified; R31.9-Hematuria,  unspecified; R18.8-Other ascites; E10.10-Type 1  diabetes mellitus with  ketoacid     REPORT: A frontal view the chest was obtained.     COMPARISON: Chest x-ray 11/26/2022 0313 hours.     The patient has been extubated, the NG tube and right-sided PICC appear  to be in satisfactory position as before. The lungs are hypoaerated and  there are central vascular crowding/congestion. Heart size is normal. No  pneumothorax or pleural effusion is identified. There previous internal  fixation of the left clavicle. No other change.       Impression:      Interval extubation of the patient with hypoaeration of  lungs, increased central vascular congestion/crowding. Correlate  clinically for mild volume overload. Stable satisfactory position of the  NG tube and right-sided PICC.  This report was finalized on 11/27/2022 09:42 by Dr. Kevin Mcgee MD.    XR Chest 1 View [[id number]] Collected: 11/26/22 0809     Updated: 11/26/22 0813    Narrative:      EXAMINATION: XR CHEST 1 VW- 11/26/2022 8:09 AM CST     HISTORY: Intubated Patient; R10.9-Unspecified abdominal pain;  R65.10-Systemic inflammatory response syndrome (sirs) of non-infectious  origin without acute organ dysfunction; R94.31-Abnormal  electrocardiogram (ECG) (EKG); K52.9-Noninfective gastroenteritis and  colitis, unspecified; E87.20-Acidosis, unspecified; R31.9-Hematuria,  unspecified; R18.8-Other ascites; E10.10-Type 1 diabetes mellitus with  ketoacid     REPORT: A frontal view of the chest was obtained.     COMPARISON: Chest x-ray 11/25/2022.     Endotracheal tube and NG tube appear in satisfactory position as before.  The lungs are hypoaerated, no infiltrate is identified. Heart size is  normal. There is evidence previous left clavicle fixation. The upper  abdomen appears unremarkable.       Impression:      Somewhat low lung volumes with no acute infiltrates.  Satisfactory stable position of endotracheal tube and NG tube.  This report was finalized on 11/26/2022 08:10 by Dr. Kevin Mcgee MD.    XR Chest 1  View [[id number]] Collected: 11/25/22 0656     Updated: 11/25/22 0701    Narrative:      EXAMINATION:  XR CHEST 1 VW-  11/25/2022 3:10 AM CST     HISTORY: Intubated. R10.9-Unspecified abdominal pain; R65.10-Systemic  inflammatory response syndrome (sirs) of non-infectious origin without  acute organ dysfunction. R94.31-Abnormal electrocardiogram (ECG) (EKG);  K52.9-Noninfective gastroenteritis and colitis, unspecified;  E87.20-Acidosis, unspecified; R31.9-Hematuria, unspecified; E10.10-Type  1 diabetes mellitus with ketoacidosis.     COMPARISON: 11/24/2022.     TECHNIQUE: Single view AP image.     FINDINGS:  There have been no tube changes. The NG tube side-port is at  or just below the gastroesophageal junction. The lungs are expanded  bilaterally and clear. The pleural spaces are clear. Heart size is  normal. Prior left clavicle fracture plate fixation. No acute bony  abnormality is seen.       Impression:      1. No focal infiltrate.  2. NG tube side-port is at or just below the GE junction. The tube could  be advanced about 4 or 5 cm distally.        This report was finalized on 11/25/2022 06:58 by Dr. Pablo Lim MD.    XR Chest 1 View [[id number]] Collected: 11/24/22 2054     Updated: 11/24/22 2059    Narrative:      EXAMINATION: Chest 1 view 11/24/2022     HISTORY: Confirm endotracheal tube placement     FINDINGS: Upright frontal projection of the chest demonstrates an  endotracheal tube to be well-positioned. An NG tube has been advanced  into the stomach. Lungs are hypoventilated but otherwise clear. No  evidence of lobar pneumonia or effusion.       Impression:      1. Expiratory chest. No acute infiltrate or effusion.  2. Endotracheal tube and NG tube well-positioned.  This report was finalized on 11/24/2022 20:55 by Dr. Orlando Clark MD.     Liver [[id number]] Collected: 11/24/22 1639     Updated: 11/24/22 1643    Narrative:      US LIVER-11/24/2022 4:23 PM CST     REASON FOR EXAM: portal  hypertension; R10.9-Unspecified abdominal pain;  R65.10-Systemic inflammatory response syndrome (sirs) of non-infectious  origin without acute organ dysfunction; R94.31-Abnormal  electrocardiogram (ECG) (EKG); K52.9-Noninfective gastroenteritis and  colitis, unspecified; E87.20-Acidosis, unspecified; R31.9-Hematuria,  unspecified; R18.8-Other ascites; E10.10-Type 1 diabetes mellitus with  ketoac       COMPARISON: NONE      TECHNIQUE: Multiple longitudinal and transverse realtime sonographic  images of the right upper quadrant of the abdomen are obtained.      FINDINGS:    Pancreas: Obscured by overlying bowel gas..      Liver: Normal in size, echogenicity and echotexture. No focal lesion.  Hepatopedal flow is noted within the portal vein.     Gallbladder: No intraluminal echoes, wall thickening, or pericholecystic  fluid.      Bile ducts: The CBD measures 0.5 cm in diameter. There is no  intrahepatic or extrahepatic ductal dilation.      Right kidney: Normal in size, shape and contour. No mass, hydronephrosis  or calculi. No perinephric fluid collection.       Other: There is ascites within the right upper quadrant..        Impression:         1. Ascites within the upper abdomen. There is hepatopedal flow within  the portal vein.  2. Liver is homogeneous in echotexture with no evidence of focal mass.  No evidence of gallstones or biliary dilatation.        This report was finalized on 11/24/2022 16:40 by Dr. Orlando Clark MD.    US Renal Bilateral [[id number]] Collected: 11/24/22 1634     Updated: 11/24/22 1639    Narrative:      RENAL ULTRASOUND COMPLETE 11/24/2022 4:23 PM CST     REASON FOR EXAM: Acute renal failure; R10.9-Unspecified abdominal pain;  R65.10-Systemic inflammatory response syndrome (sirs) of non-infectious  origin without acute organ dysfunction; R94.31-Abnormal  electrocardiogram (ECG) (EKG); K52.9-Noninfective gastroenteritis and  colitis, unspecified; E87.20-Acidosis, unspecified;  R31.9-Hematuria,  unspecified; R18.8-Other ascites; E10.10-Type 1 diabetes mellitus with  ketoac       COMPARISON: None       TECHNIQUE: Multiple longitudinal and transverse realtime sonographic  images of the kidneys and urinary bladder are obtained.      FINDINGS:      RIGHT KIDNEY: 12.0 cm. Normal in size, shape, contour and position.  There is a cortical cyst involving the midpole of the right kidney  measuring 2.3 x 1.4 x 2.0 cm. The central echo complex is compact with  no evidence for hydronephrosis. No nephrolithiasis or abnormal  perinephric fluid collections . No hydroureter.      LEFT KIDNEY: 11.1 cm. Normal in size, shape, contour and position. No  solid or cystic masses. The central echo complex is compact with no  evidence for hydronephrosis. No nephrolithiasis or abnormal perinephric  fluid collections . No hydroureter.      PELVIS: A Casper catheter is in place within the bladder. There is  ascites within the abdomen and pelvis..        Impression:      1. Cortical cyst right kidney. Otherwise normal renal ultrasound.  2. Casper catheter in place within the bladder.  3. Ascites within the abdomen and pelvis..        This report was finalized on 11/24/2022 16:36 by Dr. Orlando Clark MD.    XR Chest 1 View [[id number]] Resulted: 11/23/22 1630     Updated: 11/23/22 1630    CT Angiogram Abdomen Pelvis [[id number]] Collected: 11/23/22 1316     Updated: 11/23/22 1359    Narrative:      EXAMINATION: CT ANGIOGRAM ABDOMEN PELVIS- 11/23/2022 12:38 PM CST     HISTORY: Mesenteric ischemia, chronic. Nausea and vomiting.     DOSE: 339 mGycm (Automatic exposure control technique was implemented in  an effort to keep the radiation dose as low as possible without  compromising image quality)     REPORT: Spiral CT of the abdomen and pelvis was performed after  administration of intravenous contrast using CTA protocol, which  includes reconstructed coronal, sagittal and 3-D images. After the  initial study, the patient  was returned for delayed imaging to evaluate  the venous structures.     COMPARISON: Noncontrast CT of the abdomen pelvis approximately 4 hours  earlier.     Review of lung windows demonstrates mild dependent atelectasis, no  pleural effusion. There is a central calcified granuloma in the left  lower lobe as before. The liver and spleen appear homogeneous without  evidence of a mass. New ascites is seen around the liver and in the left  upper quadrant, extending into the paracolic gutters and into the right  pelvis. The stomach is distended with fluid. The gallbladder appears  within normal limits. The pancreas and adrenal glands are within normal  limits. The kidneys enhance normally, no renal mass or hydronephrosis is  identified. There is fluid seen throughout the small intestine, with  mild diffuse wall thickening as before. There is one dilated small bowel  loop which contains gas and fluid in the right mid abdomen measuring 3.9  cm in diameter. The colon is decompressed. No evidence of pneumatosis is  identified. On the initial scan, there is poor visualization of the  venous structures, technique is optimized for evaluation of the aorta  and mesenteric arteries. The patient was brought back and additional  delayed images obtained through the abdomen and pelvis. Unfortunately,  there is insufficient contrast within the venous structures to determine  patency of the portal vein, hepatic veins or IVC, the IVC appears small  in caliber. CTA images demonstrate normal caliber of the aorta. There is  patency of the celiac artery and its branches as well as the SMA and  TOD. There is normal caliber patency of the iliac arteries and  visualized femoral arteries. The renal arteries are normally patent. No  free air is identified. There is no obvious intra-abdominal abscess. No  oral contrast has been administered. Review of bone windows is  unremarkable.       Impression:      1. There is interval development of a  small volume of ascites since the  study performed approximately 4 hours ago. Continued fluid filled small  intestine diffusely with mild wall thickening, highly suggestive of  gastroenteritis, there is no evidence of pneumatosis, or portal vein gas  and CTA images demonstrate normal patency of the aorta and mesenteric  arteries. There is poor visualization of the venous structures on this  examination particularly in regards to the superior mesenteric vein,  hepatic and portal veins. Acute onset of ascites can be associated with  hepatic vein or portal vein thrombosis, delayed images were obtained,  however are nondiagnostic in regards to patency of the venous structures  as described above. Doppler ultrasound could be performed to help  determine if the venous structures are patent.     REPORT discussed with Dr. Travis in the emergency department at the time  of dictation.        This report was finalized on 11/23/2022 13:54 by Dr. Kevin Mcgee MD.    CT Abdomen Pelvis Without Contrast [[id number]] Collected: 11/23/22 0852     Updated: 11/23/22 0902    Narrative:      EXAMINATION: CT ABDOMEN PELVIS WO CONTRAST- 11/23/2022 8:52 AM CST     HISTORY: Abdominal pain, acute, nonlocalized     DOSE: 215 mGycm (Automatic exposure control technique was implemented in  an effort to keep the radiation dose as low as possible without  compromising image quality)     REPORT: Spiral CT of the abdomen and pelvis was performed without  contrast from the lung bases through the pubic symphysis. Reconstructed  coronal and sagittal images are also reviewed.     Comparison: There are no correlative imaging studies for comparison.     Review of lung windows demonstrates a centrally calcified granuloma in  the left lower lobe, benign. No pleural effusion is identified. The  examination is limited without intravenous and oral contrast and there  is very little intra-abdominal body fat. The liver and spleen are  homogeneous and appear  normal in size. The gallbladder is unremarkable.  The stomach is mildly distended with ingested fluid. The pancreas and  adrenal glands are unremarkable. The renal contours are smooth, no  nephrolithiasis is identified. There multiple fluid-filled loops of  small intestine throughout the abdomen, with mild diffuse wall  thickening most likely related to enteritis. No evidence of bowel  obstruction is identified. The colon appears unremarkable. No free  fluid, free air or intra-abdominal abscess is identified. Moderate  prostate enlargement with central calcifications. The appendix is  obscured. Review of bone windows is unremarkable except for moderate  degenerative disc disease L5-S1.       Impression:      1. Limited exam performed without intravenous or oral contrast with  findings compatible with gastroenteritis.        This report was finalized on 11/23/2022 08:59 by Dr. Kevin Mcgee MD.        LAB RESULTS:      Lab 11/29/22  0536 11/28/22  0154 11/27/22  0351 11/26/22  1547 11/26/22  1217 11/26/22  0639 11/25/22  1305 11/25/22  0048 11/24/22  1209   WBC 14.41* 9.71 10.44  --   --  11.33* 11.05*   < >  --    HEMOGLOBIN 8.0* 7.2* 7.5*  --   --  7.9* 7.5*   < >  --    HEMATOCRIT 25.3* 23.3* 22.8*  --   --  24.2* 23.1*   < >  --    PLATELETS 283 253 198  --   --  176 129*   < >  --    NEUTROS ABS 12.01* 7.63* 8.91*  --   --  9.45* 8.93*   < >  --    IMMATURE GRANS (ABS) 0.15* 0.07* 0.06*  --   --  0.06* 0.05   < >  --    LYMPHS ABS 0.85 1.11 0.78  --   --  1.28 1.35   < >  --    MONOS ABS 1.28* 0.73 0.64  --   --  0.52 0.71   < >  --    EOS ABS 0.10 0.15 0.03  --   --  0.01 0.00   < >  --    MCV 97.7* 98.3* 95.4  --   --  96.8 95.9   < >  --    CRP  --  9.23*  --  18.80* 17.62*  --   --   --   --    LACTATE  --   --   --   --   --   --   --   --  3.6*   PROTIME  --  15.7*  --   --   --   --   --   --   --     < > = values in this interval not displayed.         Lab 12/01/22  0457 11/30/22  0531 11/29/22  0536  11/28/22  0615 11/28/22  0154 11/27/22  0351   SODIUM 140 140 142  --  146* 145   POTASSIUM 3.9 3.5 3.9  --  3.4* 3.7   CHLORIDE 107 109* 110*  --  114* 112*   CO2 24.0 24.0 26.0  --  29.0 28.0   ANION GAP 9.0 7.0 6.0  --  3.0* 5.0   BUN 19 20 18  --  18 21*   CREATININE 0.66* 0.65* 0.61*  --  0.63* 0.74*   EGFR 119.4 119.9 122.2  --  121.0 115.3   GLUCOSE 115* 133* 139*  --  145* 135*   CALCIUM 7.8* 7.8* 8.0*  --  7.7* 7.4*   IONIZED CALCIUM  --   --   --  4.51*  --   --    MAGNESIUM 2.3 2.0 2.1  --  2.2 2.2   PHOSPHORUS 4.7* 3.5 3.2  --  2.3* 2.3*         Lab 11/29/22  0536 11/28/22  0154 11/27/22  0351 11/26/22  1217 11/26/22  0639   TOTAL PROTEIN 4.7* 4.5* 4.2* 4.2* 4.1*   ALBUMIN 2.60* 2.60* 2.40* 2.30* 2.20*   GLOBULIN 2.1 1.9 1.8 1.9 1.9   ALT (SGPT) 16 17 15 17 17   AST (SGOT) 11 12 14 18 36   BILIRUBIN 0.3 0.2 0.2 0.4 0.4   ALK PHOS 40 33* 29* 27* 26*         Lab 11/28/22  0154   PROTIME 15.7*   INR 1.30*         Lab 11/28/22  0154 11/26/22  1547 11/26/22  1217   CHOLESTEROL  --  62 57   TRIGLYCERIDES 108 74 81             Lab 11/26/22  0356 11/25/22  0411 11/24/22  2111   PH, ARTERIAL 7.420 7.415 7.353   PCO2, ARTERIAL 45.7* 43.0 41.5   PO2 .0* 128.0* 206.0*   O2 SATURATION ART 99.2* 99.4* >100.1*   FIO2 30 30 50   HCO3 ART 29.6* 27.6* 23.0   BASE EXCESS ART 4.6* 2.7* -2.4*     Brief Urine Lab Results  (Last result in the past 365 days)      Color   Clarity   Blood   Leuk Est   Nitrite   Protein   CREAT   Urine HCG        11/23/22 0829 Dark Yellow   Clear   Trace   Negative   Negative   100 mg/dL (2+)               Microbiology Results (last 10 days)     Procedure Component Value - Date/Time    Blood Culture - Blood, Arm, Right [[id number]]  (Normal) Collected: 11/23/22 0920    Lab Status: Final result Specimen: Blood from Arm, Right Updated: 11/28/22 1545     Blood Culture No growth at 5 days    Chlamydia trachomatis, Neisseria gonorrhoeae, PCR - Urine, Urine, Random Void [[id number]]  (Normal)  "Collected: 11/23/22 0842    Lab Status: Final result Specimen: Urine, Random Void Updated: 11/23/22 1433     Chlamydia DNA by PCR Not Detected     Neisseria gonorrhoeae by PCR Not Detected    Narrative:      Disclaimer: The Aptima Combo 2 assay is a target amplification nucleic acid probe test that utilizes target capture for the in vitro qualitative detection and differentiation of ribosomal RNA from Chlamydia trachomatis and Neisseria gonorrhoeae to aid in the diagnosis of chlamydial and/or gonococcal urogenital disease.  Cell culture was once considered to be the \"gold standard\" for detection of CT and NG.  Culture is quite specific, but scientific studies have demonstrated that the NAAT DNA probe technologies have higher clinical sensitivities than culture.    Blood Culture - Blood, Hand, Digit Left [[id number]]  (Normal) Collected: 11/23/22 0824    Lab Status: Final result Specimen: Blood from Hand, Digit Left Updated: 11/28/22 1531     Blood Culture No growth at 5 days          Hospital Course:   Patient presented on 11/23 for abdominal pain.  Patient admitted by Dr. Pappas.     CT scan of abdomen and pelvis showed:  IMPRESSION:  1. There is interval development of a small volume of ascites since the study performed approximately 4 hours ago. Continued fluid filled small intestine diffusely with mild wall thickening, highly suggestive of gastroenteritis, there is no evidence of pneumatosis, or portal vein gas and CTA images demonstrate normal patency of the aorta and mesenteric arteries. There is poor visualization of the venous structures on this examination particularly in regards to the superior mesenteric vein, hepatic and portal veins. Acute onset of ascites can be associated with hepatic vein or portal vein thrombosis, delayed images were obtained, however are nondiagnostic in regards to patency of the venous structures as described above. Doppler ultrasound could be performed to help determine if the " "venous structures are patent.     Dr. Pappas initially consulted Dr. Altamirano with vascular surgery no signs of acute vascular issue or signs of mesenteric ischemia.  US of liver with doppler was grossly normal.     General surgery consulted, NG placed for decompression and there was significant distension of stomach.  On 11/24, patient had significant worsening of distension, examination, and pain.  Patient was taken to OR for exploratory laparotomy and small bowel resection.  Patient was found to have a closed-loop obstruction due to single adhesive band and had necrotic bowel.   Patient went to OR on 11/25 again and had right colon resection and subsequent anastomosis.  NG removed.  Patient had BMs.  Patient was noted to tolerate full liquids will advance to regular diet.       Patient presented with Cr of 0.82 but quickly went up to < 4 within 48 hours of presentation.  Cr back to baseline at present with IVF.  Appreciate nephrology input.      ID was consulted.  Patient received a full course of flagyl and cefepime.       Pulmonary consulted on 11/25 for ventilator management post-operatively, patient has subsequently been extubated.      Incentive spirometer     Pain medication per general surgery     Ambulate TID      Physical Exam on Discharge:  /67 (BP Location: Right arm, Patient Position: Sitting)   Pulse 78   Temp 98.4 °F (36.9 °C) (Oral)   Resp 18   Ht 182.9 cm (72\")   Wt 82.6 kg (182 lb 2 oz)   SpO2 100%   BMI 24.70 kg/m²   Physical Exam  Vitals and nursing note reviewed.   Constitutional:       Appearance: Normal appearance.   HENT:      Head: Normocephalic and atraumatic.      Mouth/Throat:      Mouth: Mucous membranes are dry.      Pharynx: Oropharynx is clear.   Eyes:      General: No scleral icterus.     Conjunctiva/sclera: Conjunctivae normal.   Cardiovascular:      Rate and Rhythm: Normal rate and regular rhythm.      Heart sounds: No murmur heard.  Pulmonary:      Effort: Pulmonary " effort is normal. No respiratory distress.      Breath sounds: Normal breath sounds. No stridor.   Abdominal:      General: Abdomen is flat. Bowel sounds are normal. There is no distension.      Palpations: Abdomen is soft. There is no mass.   Musculoskeletal:      Right lower leg: No edema.      Left lower leg: No edema.   Skin:     General: Skin is warm and dry.      Coloration: Skin is not jaundiced or pale.   Neurological:      General: No focal deficit present.      Mental Status: He is alert and oriented to person, place, and time.   Psychiatric:         Mood and Affect: Mood normal.         Behavior: Behavior normal.     Condition on Discharge: Stable    Discharge Disposition:  Home or Self Care    Discharge Medications:     Discharge Medications      New Medications      Instructions Start Date   acetaminophen 325 MG tablet  Commonly known as: TYLENOL   650 mg, Oral, Every 4 Hours PRN      ondansetron 4 MG tablet  Commonly known as: ZOFRAN   4 mg, Oral, Every 6 Hours PRN         Continue These Medications      Instructions Start Date   amphetamine-dextroamphetamine 20 MG tablet  Commonly known as: ADDERALL   20 mg, Oral, Daily      anastrozole 1 MG tablet  Commonly known as: ARIMIDEX   Oral, Daily             Discharge Diet:   Diet Instructions     Diet: Regular; Thin      Discharge Diet: Regular    Fluid Consistency: Thin          Activity at Discharge:   Activity Instructions     Activity as Tolerated            Follow-up Appointments:   No future appointments.    Test Results Pending at Discharge: None    Electronically signed by Tulio Silva MD, 12/01/22, 10:51 CST.    Time: 35 minutes.

## 2022-12-01 NOTE — CASE MANAGEMENT/SOCIAL WORK
Continued Stay Note   Rachel     Patient Name: Kevin Gorline  MRN: [id number]  Today's Date: 12/1/2022    Admit Date: 11/23/2022    Plan: Home   Discharge Plan     Row Name 12/01/22 1052       Plan    Plan Home    Final Discharge Disposition Code 01 - home or self-care    Final Note Patient is discharged home today with no dc planning needs / orders.                       Expected Discharge Date and Time     Expected Discharge Date Expected Discharge Time    Dec 1, 2022             BRANDON Trammell

## 2022-12-02 LAB
CYTO UR: NORMAL
LAB AP CASE REPORT: NORMAL
Lab: NORMAL
PATH REPORT.FINAL DX SPEC: NORMAL
PATH REPORT.GROSS SPEC: NORMAL

## 2022-12-02 NOTE — PAYOR COMM NOTE
"REF:  [id number]    [facility]  ABIGAIL,   [phone]  OR  FAX   [phone]     Gorline, Kevin (42 y.o. Male)     Date of Birth   1980    Social Security Number       Address   [address] [place] [address]    Home Phone   [phone]    MRN   [id number]       Religious   Other    Marital Status                               Admission Date   11/23/22    Admission Type   Emergency    Admitting Provider   Tulio Silva MD    Attending Provider       Department, Room/Bed   [facility] 4B, 441/1       Discharge Date   12/1/2022    Discharge Disposition   Home or Self Care    Discharge Destination   Home                            Attending Provider: (none)   Allergies: Sulfa Antibiotics    Isolation: None   Infection: None   Code Status: Prior    Ht: 182.9 cm (72\")   Wt: 82.6 kg (182 lb 2 oz)    Admission Cmt: None   Principal Problem: Sepsis associated hypotension (HCC) [A41.9,I95.9]                 Active Insurance as of 11/23/2022     Primary Coverage     Payor Plan Insurance Group Employer/Plan Group    [facility] DEPT [id number]      Payor Plan Address Payor Plan Phone Number Payor Plan Fax Number Effective Dates    [facility] OFFICE OF COMMUNITY CARE [phone]  11/23/2022 - None Entered    [address] [address]       [facility] [address]       Subscriber Name Subscriber Birth Date Member ID       GORLINE,KEVIN 1980 [id number]                 Emergency Contacts      (Rel.) Home Phone Work Phone Mobile Phone    THA CHAHAL (Mother) [phone] -- --               Discharge Summary      Tulio Silva MD at 12/01/22 1051                [facility] Medicine Services  DISCHARGE SUMMARY       Date of Admission: 11/23/2022  Date of Discharge:  12/1/2022  Primary Care Physician: Brynn Crum APRN    Presenting Problem/History of Present Illness:  Abdominal pain    Final Discharge Diagnoses:  Active " Hospital Problems    Diagnosis    • **Sepsis associated hypotension (HCC)    • Abdominal pain, unspecified abdominal location    • Acute gastroenteritis    • Ascites    • Hyperglycemia    • Hematuria    • ROGELIO (acute kidney injury) (HCC)        Consults:   General surgery  ID   Pulmonary  Nephrology  Vascular surgery    Procedures Performed:   Dr. Aranda 11/24: LAPAROTOMY EXPLORATORY, SMALL BOWEL RESECTION    Dr. Aranda 11/25: LAPAROTOMY EXPLORATORY RIGHT COLON RESECTION ANASTOMOSIS      Pertinent Test Results:   Results for orders placed during the hospital encounter of 11/23/22    Adult Transthoracic Echo Complete W/ Cont if Necessary Per Protocol    Interpretation Summary  •  Left ventricular systolic function is normal. Left ventricular ejection fraction appears to be 66 - 70%.  •  Left ventricular diastolic function was normal.  •  Normal right ventricular cavity size and systolic function noted.  •  No evidence of pulmonary hypertension is present.  •  No hemodynamically significant valvular disease.      Imaging Results (All)     Procedure Component Value Units Date/Time    XR Chest 1 View [[id number]] Collected: 11/27/22 0941     Updated: 11/27/22 0945    Narrative:      EXAMINATION: XR CHEST 1 VW- 11/27/2022 9:41 AM CST     HISTORY: Intubated Patient; R10.9-Unspecified abdominal pain;  R65.10-Systemic inflammatory response syndrome (sirs) of non-infectious  origin without acute organ dysfunction; R94.31-Abnormal  electrocardiogram (ECG) (EKG); K52.9-Noninfective gastroenteritis and  colitis, unspecified; E87.20-Acidosis, unspecified; R31.9-Hematuria,  unspecified; R18.8-Other ascites; E10.10-Type 1 diabetes mellitus with  ketoacid     REPORT: A frontal view the chest was obtained.     COMPARISON: Chest x-ray 11/26/2022 0313 hours.     The patient has been extubated, the NG tube and right-sided PICC appear  to be in satisfactory position as before. The lungs are hypoaerated and  there are central vascular  crowding/congestion. Heart size is normal. No  pneumothorax or pleural effusion is identified. There previous internal  fixation of the left clavicle. No other change.       Impression:      Interval extubation of the patient with hypoaeration of  lungs, increased central vascular congestion/crowding. Correlate  clinically for mild volume overload. Stable satisfactory position of the  NG tube and right-sided PICC.  This report was finalized on 11/27/2022 09:42 by Dr. Kevin Mcgee MD.    XR Chest 1 View [[id number]] Collected: 11/26/22 0809     Updated: 11/26/22 0813    Narrative:      EXAMINATION: XR CHEST 1 VW- 11/26/2022 8:09 AM CST     HISTORY: Intubated Patient; R10.9-Unspecified abdominal pain;  R65.10-Systemic inflammatory response syndrome (sirs) of non-infectious  origin without acute organ dysfunction; R94.31-Abnormal  electrocardiogram (ECG) (EKG); K52.9-Noninfective gastroenteritis and  colitis, unspecified; E87.20-Acidosis, unspecified; R31.9-Hematuria,  unspecified; R18.8-Other ascites; E10.10-Type 1 diabetes mellitus with  ketoacid     REPORT: A frontal view of the chest was obtained.     COMPARISON: Chest x-ray 11/25/2022.     Endotracheal tube and NG tube appear in satisfactory position as before.  The lungs are hypoaerated, no infiltrate is identified. Heart size is  normal. There is evidence previous left clavicle fixation. The upper  abdomen appears unremarkable.       Impression:      Somewhat low lung volumes with no acute infiltrates.  Satisfactory stable position of endotracheal tube and NG tube.  This report was finalized on 11/26/2022 08:10 by Dr. Kevin Mcgee MD.    XR Chest 1 View [[id number]] Collected: 11/25/22 0656     Updated: 11/25/22 0701    Narrative:      EXAMINATION:  XR CHEST 1 VW-  11/25/2022 3:10 AM CST     HISTORY: Intubated. R10.9-Unspecified abdominal pain; R65.10-Systemic  inflammatory response syndrome (sirs) of non-infectious origin without  acute organ dysfunction.  R94.31-Abnormal electrocardiogram (ECG) (EKG);  K52.9-Noninfective gastroenteritis and colitis, unspecified;  E87.20-Acidosis, unspecified; R31.9-Hematuria, unspecified; E10.10-Type  1 diabetes mellitus with ketoacidosis.     COMPARISON: 11/24/2022.     TECHNIQUE: Single view AP image.     FINDINGS:  There have been no tube changes. The NG tube side-port is at  or just below the gastroesophageal junction. The lungs are expanded  bilaterally and clear. The pleural spaces are clear. Heart size is  normal. Prior left clavicle fracture plate fixation. No acute bony  abnormality is seen.       Impression:      1. No focal infiltrate.  2. NG tube side-port is at or just below the GE junction. The tube could  be advanced about 4 or 5 cm distally.        This report was finalized on 11/25/2022 06:58 by Dr. Pablo Lim MD.    XR Chest 1 View [[id number]] Collected: 11/24/22 2054     Updated: 11/24/22 2059    Narrative:      EXAMINATION: Chest 1 view 11/24/2022     HISTORY: Confirm endotracheal tube placement     FINDINGS: Upright frontal projection of the chest demonstrates an  endotracheal tube to be well-positioned. An NG tube has been advanced  into the stomach. Lungs are hypoventilated but otherwise clear. No  evidence of lobar pneumonia or effusion.       Impression:      1. Expiratory chest. No acute infiltrate or effusion.  2. Endotracheal tube and NG tube well-positioned.  This report was finalized on 11/24/2022 20:55 by Dr. Orlando Clark MD.     Liver [[id number]] Collected: 11/24/22 1639     Updated: 11/24/22 1643    Narrative:      US LIVER-11/24/2022 4:23 PM CST     REASON FOR EXAM: portal hypertension; R10.9-Unspecified abdominal pain;  R65.10-Systemic inflammatory response syndrome (sirs) of non-infectious  origin without acute organ dysfunction; R94.31-Abnormal  electrocardiogram (ECG) (EKG); K52.9-Noninfective gastroenteritis and  colitis, unspecified; E87.20-Acidosis, unspecified;  R31.9-Hematuria,  unspecified; R18.8-Other ascites; E10.10-Type 1 diabetes mellitus with  ketoac       COMPARISON: NONE      TECHNIQUE: Multiple longitudinal and transverse realtime sonographic  images of the right upper quadrant of the abdomen are obtained.      FINDINGS:    Pancreas: Obscured by overlying bowel gas..      Liver: Normal in size, echogenicity and echotexture. No focal lesion.  Hepatopedal flow is noted within the portal vein.     Gallbladder: No intraluminal echoes, wall thickening, or pericholecystic  fluid.      Bile ducts: The CBD measures 0.5 cm in diameter. There is no  intrahepatic or extrahepatic ductal dilation.      Right kidney: Normal in size, shape and contour. No mass, hydronephrosis  or calculi. No perinephric fluid collection.       Other: There is ascites within the right upper quadrant..        Impression:         1. Ascites within the upper abdomen. There is hepatopedal flow within  the portal vein.  2. Liver is homogeneous in echotexture with no evidence of focal mass.  No evidence of gallstones or biliary dilatation.        This report was finalized on 11/24/2022 16:40 by Dr. Orlando Clark MD.    US Renal Bilateral [[id number]] Collected: 11/24/22 1634     Updated: 11/24/22 1639    Narrative:      RENAL ULTRASOUND COMPLETE 11/24/2022 4:23 PM CST     REASON FOR EXAM: Acute renal failure; R10.9-Unspecified abdominal pain;  R65.10-Systemic inflammatory response syndrome (sirs) of non-infectious  origin without acute organ dysfunction; R94.31-Abnormal  electrocardiogram (ECG) (EKG); K52.9-Noninfective gastroenteritis and  colitis, unspecified; E87.20-Acidosis, unspecified; R31.9-Hematuria,  unspecified; R18.8-Other ascites; E10.10-Type 1 diabetes mellitus with  ketoac       COMPARISON: None       TECHNIQUE: Multiple longitudinal and transverse realtime sonographic  images of the kidneys and urinary bladder are obtained.      FINDINGS:      RIGHT KIDNEY: 12.0 cm. Normal in size,  shape, contour and position.  There is a cortical cyst involving the midpole of the right kidney  measuring 2.3 x 1.4 x 2.0 cm. The central echo complex is compact with  no evidence for hydronephrosis. No nephrolithiasis or abnormal  perinephric fluid collections . No hydroureter.      LEFT KIDNEY: 11.1 cm. Normal in size, shape, contour and position. No  solid or cystic masses. The central echo complex is compact with no  evidence for hydronephrosis. No nephrolithiasis or abnormal perinephric  fluid collections . No hydroureter.      PELVIS: A Casper catheter is in place within the bladder. There is  ascites within the abdomen and pelvis..        Impression:      1. Cortical cyst right kidney. Otherwise normal renal ultrasound.  2. Casper catheter in place within the bladder.  3. Ascites within the abdomen and pelvis..        This report was finalized on 11/24/2022 16:36 by Dr. Orlando Clark MD.    XR Chest 1 View [[id number]] Resulted: 11/23/22 1630     Updated: 11/23/22 1630    CT Angiogram Abdomen Pelvis [[id number]] Collected: 11/23/22 1316     Updated: 11/23/22 1359    Narrative:      EXAMINATION: CT ANGIOGRAM ABDOMEN PELVIS- 11/23/2022 12:38 PM CST     HISTORY: Mesenteric ischemia, chronic. Nausea and vomiting.     DOSE: 339 mGycm (Automatic exposure control technique was implemented in  an effort to keep the radiation dose as low as possible without  compromising image quality)     REPORT: Spiral CT of the abdomen and pelvis was performed after  administration of intravenous contrast using CTA protocol, which  includes reconstructed coronal, sagittal and 3-D images. After the  initial study, the patient was returned for delayed imaging to evaluate  the venous structures.     COMPARISON: Noncontrast CT of the abdomen pelvis approximately 4 hours  earlier.     Review of lung windows demonstrates mild dependent atelectasis, no  pleural effusion. There is a central calcified granuloma in the left  lower lobe as  before. The liver and spleen appear homogeneous without  evidence of a mass. New ascites is seen around the liver and in the left  upper quadrant, extending into the paracolic gutters and into the right  pelvis. The stomach is distended with fluid. The gallbladder appears  within normal limits. The pancreas and adrenal glands are within normal  limits. The kidneys enhance normally, no renal mass or hydronephrosis is  identified. There is fluid seen throughout the small intestine, with  mild diffuse wall thickening as before. There is one dilated small bowel  loop which contains gas and fluid in the right mid abdomen measuring 3.9  cm in diameter. The colon is decompressed. No evidence of pneumatosis is  identified. On the initial scan, there is poor visualization of the  venous structures, technique is optimized for evaluation of the aorta  and mesenteric arteries. The patient was brought back and additional  delayed images obtained through the abdomen and pelvis. Unfortunately,  there is insufficient contrast within the venous structures to determine  patency of the portal vein, hepatic veins or IVC, the IVC appears small  in caliber. CTA images demonstrate normal caliber of the aorta. There is  patency of the celiac artery and its branches as well as the SMA and  TOD. There is normal caliber patency of the iliac arteries and  visualized femoral arteries. The renal arteries are normally patent. No  free air is identified. There is no obvious intra-abdominal abscess. No  oral contrast has been administered. Review of bone windows is  unremarkable.       Impression:      1. There is interval development of a small volume of ascites since the  study performed approximately 4 hours ago. Continued fluid filled small  intestine diffusely with mild wall thickening, highly suggestive of  gastroenteritis, there is no evidence of pneumatosis, or portal vein gas  and CTA images demonstrate normal patency of the aorta and  mesenteric  arteries. There is poor visualization of the venous structures on this  examination particularly in regards to the superior mesenteric vein,  hepatic and portal veins. Acute onset of ascites can be associated with  hepatic vein or portal vein thrombosis, delayed images were obtained,  however are nondiagnostic in regards to patency of the venous structures  as described above. Doppler ultrasound could be performed to help  determine if the venous structures are patent.     REPORT discussed with Dr. Travis in the emergency department at the time  of dictation.        This report was finalized on 11/23/2022 13:54 by Dr. Kevin Mcgee MD.    CT Abdomen Pelvis Without Contrast [[id number]] Collected: 11/23/22 0852     Updated: 11/23/22 0902    Narrative:      EXAMINATION: CT ABDOMEN PELVIS WO CONTRAST- 11/23/2022 8:52 AM CST     HISTORY: Abdominal pain, acute, nonlocalized     DOSE: 215 mGycm (Automatic exposure control technique was implemented in  an effort to keep the radiation dose as low as possible without  compromising image quality)     REPORT: Spiral CT of the abdomen and pelvis was performed without  contrast from the lung bases through the pubic symphysis. Reconstructed  coronal and sagittal images are also reviewed.     Comparison: There are no correlative imaging studies for comparison.     Review of lung windows demonstrates a centrally calcified granuloma in  the left lower lobe, benign. No pleural effusion is identified. The  examination is limited without intravenous and oral contrast and there  is very little intra-abdominal body fat. The liver and spleen are  homogeneous and appear normal in size. The gallbladder is unremarkable.  The stomach is mildly distended with ingested fluid. The pancreas and  adrenal glands are unremarkable. The renal contours are smooth, no  nephrolithiasis is identified. There multiple fluid-filled loops of  small intestine throughout the abdomen, with mild  diffuse wall  thickening most likely related to enteritis. No evidence of bowel  obstruction is identified. The colon appears unremarkable. No free  fluid, free air or intra-abdominal abscess is identified. Moderate  prostate enlargement with central calcifications. The appendix is  obscured. Review of bone windows is unremarkable except for moderate  degenerative disc disease L5-S1.       Impression:      1. Limited exam performed without intravenous or oral contrast with  findings compatible with gastroenteritis.        This report was finalized on 11/23/2022 08:59 by Dr. Kevin Mcgee MD.        LAB RESULTS:      Lab 11/29/22  0536 11/28/22  0154 11/27/22  0351 11/26/22  1547 11/26/22  1217 11/26/22  0639 11/25/22  1305 11/25/22  0048 11/24/22  1209   WBC 14.41* 9.71 10.44  --   --  11.33* 11.05*   < >  --    HEMOGLOBIN 8.0* 7.2* 7.5*  --   --  7.9* 7.5*   < >  --    HEMATOCRIT 25.3* 23.3* 22.8*  --   --  24.2* 23.1*   < >  --    PLATELETS 283 253 198  --   --  176 129*   < >  --    NEUTROS ABS 12.01* 7.63* 8.91*  --   --  9.45* 8.93*   < >  --    IMMATURE GRANS (ABS) 0.15* 0.07* 0.06*  --   --  0.06* 0.05   < >  --    LYMPHS ABS 0.85 1.11 0.78  --   --  1.28 1.35   < >  --    MONOS ABS 1.28* 0.73 0.64  --   --  0.52 0.71   < >  --    EOS ABS 0.10 0.15 0.03  --   --  0.01 0.00   < >  --    MCV 97.7* 98.3* 95.4  --   --  96.8 95.9   < >  --    CRP  --  9.23*  --  18.80* 17.62*  --   --   --   --    LACTATE  --   --   --   --   --   --   --   --  3.6*   PROTIME  --  15.7*  --   --   --   --   --   --   --     < > = values in this interval not displayed.         Lab 12/01/22  0457 11/30/22  0531 11/29/22  0536 11/28/22  0615 11/28/22  0154 11/27/22  0351   SODIUM 140 140 142  --  146* 145   POTASSIUM 3.9 3.5 3.9  --  3.4* 3.7   CHLORIDE 107 109* 110*  --  114* 112*   CO2 24.0 24.0 26.0  --  29.0 28.0   ANION GAP 9.0 7.0 6.0  --  3.0* 5.0   BUN 19 20 18  --  18 21*   CREATININE 0.66* 0.65* 0.61*  --  0.63* 0.74*    EGFR 119.4 119.9 122.2  --  121.0 115.3   GLUCOSE 115* 133* 139*  --  145* 135*   CALCIUM 7.8* 7.8* 8.0*  --  7.7* 7.4*   IONIZED CALCIUM  --   --   --  4.51*  --   --    MAGNESIUM 2.3 2.0 2.1  --  2.2 2.2   PHOSPHORUS 4.7* 3.5 3.2  --  2.3* 2.3*         Lab 11/29/22  0536 11/28/22  0154 11/27/22  0351 11/26/22  1217 11/26/22  0639   TOTAL PROTEIN 4.7* 4.5* 4.2* 4.2* 4.1*   ALBUMIN 2.60* 2.60* 2.40* 2.30* 2.20*   GLOBULIN 2.1 1.9 1.8 1.9 1.9   ALT (SGPT) 16 17 15 17 17   AST (SGOT) 11 12 14 18 36   BILIRUBIN 0.3 0.2 0.2 0.4 0.4   ALK PHOS 40 33* 29* 27* 26*         Lab 11/28/22  0154   PROTIME 15.7*   INR 1.30*         Lab 11/28/22  0154 11/26/22  1547 11/26/22  1217   CHOLESTEROL  --  62 57   TRIGLYCERIDES 108 74 81             Lab 11/26/22  0356 11/25/22  0411 11/24/22  2111   PH, ARTERIAL 7.420 7.415 7.353   PCO2, ARTERIAL 45.7* 43.0 41.5   PO2 .0* 128.0* 206.0*   O2 SATURATION ART 99.2* 99.4* >100.1*   FIO2 30 30 50   HCO3 ART 29.6* 27.6* 23.0   BASE EXCESS ART 4.6* 2.7* -2.4*     Brief Urine Lab Results  (Last result in the past 365 days)      Color   Clarity   Blood   Leuk Est   Nitrite   Protein   CREAT   Urine HCG        11/23/22 0829 Dark Yellow   Clear   Trace   Negative   Negative   100 mg/dL (2+)               Microbiology Results (last 10 days)     Procedure Component Value - Date/Time    Blood Culture - Blood, Arm, Right [[id number]]  (Normal) Collected: 11/23/22 0920    Lab Status: Final result Specimen: Blood from Arm, Right Updated: 11/28/22 1545     Blood Culture No growth at 5 days    Chlamydia trachomatis, Neisseria gonorrhoeae, PCR - Urine, Urine, Random Void [[id number]]  (Normal) Collected: 11/23/22 0842    Lab Status: Final result Specimen: Urine, Random Void Updated: 11/23/22 1433     Chlamydia DNA by PCR Not Detected     Neisseria gonorrhoeae by PCR Not Detected    Narrative:      Disclaimer: The Aptima Combo 2 assay is a target amplification nucleic acid probe test that utilizes  "target capture for the in vitro qualitative detection and differentiation of ribosomal RNA from Chlamydia trachomatis and Neisseria gonorrhoeae to aid in the diagnosis of chlamydial and/or gonococcal urogenital disease.  Cell culture was once considered to be the \"gold standard\" for detection of CT and NG.  Culture is quite specific, but scientific studies have demonstrated that the NAAT DNA probe technologies have higher clinical sensitivities than culture.    Blood Culture - Blood, Hand, Digit Left [[id number]]  (Normal) Collected: 11/23/22 0824    Lab Status: Final result Specimen: Blood from Hand, Digit Left Updated: 11/28/22 1531     Blood Culture No growth at 5 days          Hospital Course:   Patient presented on 11/23 for abdominal pain.  Patient admitted by Dr. Pappas.     CT scan of abdomen and pelvis showed:  IMPRESSION:  1. There is interval development of a small volume of ascites since the study performed approximately 4 hours ago. Continued fluid filled small intestine diffusely with mild wall thickening, highly suggestive of gastroenteritis, there is no evidence of pneumatosis, or portal vein gas and CTA images demonstrate normal patency of the aorta and mesenteric arteries. There is poor visualization of the venous structures on this examination particularly in regards to the superior mesenteric vein, hepatic and portal veins. Acute onset of ascites can be associated with hepatic vein or portal vein thrombosis, delayed images were obtained, however are nondiagnostic in regards to patency of the venous structures as described above. Doppler ultrasound could be performed to help determine if the venous structures are patent.     Dr. Pappas initially consulted Dr. Altamirano with vascular surgery no signs of acute vascular issue or signs of mesenteric ischemia.  US of liver with doppler was grossly normal.     General surgery consulted, NG placed for decompression and there was significant distension of " "stomach.  On 11/24, patient had significant worsening of distension, examination, and pain.  Patient was taken to OR for exploratory laparotomy and small bowel resection.  Patient was found to have a closed-loop obstruction due to single adhesive band and had necrotic bowel.   Patient went to OR on 11/25 again and had right colon resection and subsequent anastomosis.  NG removed.  Patient had BMs.  Patient was noted to tolerate full liquids will advance to regular diet.       Patient presented with Cr of 0.82 but quickly went up to < 4 within 48 hours of presentation.  Cr back to baseline at present with IVF.  Appreciate nephrology input.      ID was consulted.  Patient received a full course of flagyl and cefepime.       Pulmonary consulted on 11/25 for ventilator management post-operatively, patient has subsequently been extubated.      Incentive spirometer     Pain medication per general surgery     Ambulate TID      Physical Exam on Discharge:  /67 (BP Location: Right arm, Patient Position: Sitting)   Pulse 78   Temp 98.4 °F (36.9 °C) (Oral)   Resp 18   Ht 182.9 cm (72\")   Wt 82.6 kg (182 lb 2 oz)   SpO2 100%   BMI 24.70 kg/m²   Physical Exam  Vitals and nursing note reviewed.   Constitutional:       Appearance: Normal appearance.   HENT:      Head: Normocephalic and atraumatic.      Mouth/Throat:      Mouth: Mucous membranes are dry.      Pharynx: Oropharynx is clear.   Eyes:      General: No scleral icterus.     Conjunctiva/sclera: Conjunctivae normal.   Cardiovascular:      Rate and Rhythm: Normal rate and regular rhythm.      Heart sounds: No murmur heard.  Pulmonary:      Effort: Pulmonary effort is normal. No respiratory distress.      Breath sounds: Normal breath sounds. No stridor.   Abdominal:      General: Abdomen is flat. Bowel sounds are normal. There is no distension.      Palpations: Abdomen is soft. There is no mass.   Musculoskeletal:      Right lower leg: No edema.      Left lower " leg: No edema.   Skin:     General: Skin is warm and dry.      Coloration: Skin is not jaundiced or pale.   Neurological:      General: No focal deficit present.      Mental Status: He is alert and oriented to person, place, and time.   Psychiatric:         Mood and Affect: Mood normal.         Behavior: Behavior normal.     Condition on Discharge: Stable    Discharge Disposition:  Home or Self Care    Discharge Medications:     Discharge Medications      New Medications      Instructions Start Date   acetaminophen 325 MG tablet  Commonly known as: TYLENOL   650 mg, Oral, Every 4 Hours PRN      ondansetron 4 MG tablet  Commonly known as: ZOFRAN   4 mg, Oral, Every 6 Hours PRN         Continue These Medications      Instructions Start Date   amphetamine-dextroamphetamine 20 MG tablet  Commonly known as: ADDERALL   20 mg, Oral, Daily      anastrozole 1 MG tablet  Commonly known as: ARIMIDEX   Oral, Daily             Discharge Diet:   Diet Instructions     Diet: Regular; Thin      Discharge Diet: Regular    Fluid Consistency: Thin          Activity at Discharge:   Activity Instructions     Activity as Tolerated            Follow-up Appointments:   No future appointments.    Test Results Pending at Discharge: None    Electronically signed by Tulio Silva MD, 12/01/22, 10:51 CST.    Time: 35 minutes.           Electronically signed by Tulio Silva MD at 12/01/22 1054       Discharge Order (From admission, onward)     Start     Ordered    12/01/22 1048  Discharge patient  Once        Expected Discharge Date: 12/01/22    Discharge Disposition: Home or Self Care    Physician of Record for Attribution - Please select from Treatment Team: JOSE MARR [[id number]]    Review needed by CMO to determine Physician of Record: No       Question Answer Comment   Physician of Record for Attribution - Please select from Treatment Team JOSE MARR    Review needed by CMO to determine Physician of Record No        12/01/22  9508

## 2022-12-02 NOTE — PROGRESS NOTES
Enter Query Response Below      Query Response:     Abdominal infection suspected due to bowel necrosis    Electronically signed by Tulio Silva MD, 22, 7:43 AM CST.           If applicable, please update the problem list.        Patient: Gorline, Kevin        : 1980  Account: [id number]           Admit Date: 2022        How to Respond to this query:       a. Click New Note     b. Answer query within the yellow box.                c. Update the Problem List, if applicable.      If you have any questions about this query contact me at: [email or web address]     Dr. Silva,    Patient presented with abdominal pain on .  Patient underwent open small bowel resection on  with findings of bloody ascites and necrosis of distal 2/3 of small bowel.  Patient was treated with IV fluids and IV antibiotics.  Sepsis is documented throughout this encounter, however documentation of a specific infectious source is not noted.     Please provide the source of infection for patient's sepsis.                  By submitting this query, we are merely seeking further clarification of documentation to accurately reflect all conditions that you are monitoring, evaluating, treating or that extend the hospitalization or utilize additional resources of care. Please utilize your independent clinical judgment when addressing the question(s) above.     This query and your response, once completed, will be entered into the legal medical record.    Sincerely,  Neli Lopez RN CCDS [facility]  Clinical Documentation Integrity Program

## 2022-12-06 ENCOUNTER — READMISSION MANAGEMENT (OUTPATIENT)
Dept: CALL CENTER | Facility: HOSPITAL | Age: 42
End: 2022-12-06

## 2022-12-06 ENCOUNTER — OFFICE VISIT (OUTPATIENT)
Dept: SURGERY | Facility: CLINIC | Age: 42
End: 2022-12-06

## 2022-12-06 VITALS
HEART RATE: 91 BPM | WEIGHT: 162.2 LBS | BODY MASS INDEX: 21.97 KG/M2 | HEIGHT: 72 IN | SYSTOLIC BLOOD PRESSURE: 114 MMHG | DIASTOLIC BLOOD PRESSURE: 68 MMHG | TEMPERATURE: 98.3 F

## 2022-12-06 DIAGNOSIS — Z51.89 VISIT FOR WOUND CHECK: Primary | ICD-10-CM

## 2022-12-06 PROCEDURE — 99024 POSTOP FOLLOW-UP VISIT: CPT | Performed by: SPECIALIST

## 2022-12-06 NOTE — OUTREACH NOTE
Sepsis Week 1 Survey    Flowsheet Row Responses   [facility] patient discharged from? [place]   Does the patient have one of the following disease processes/diagnoses(primary or secondary)? Sepsis   Week 1 attempt successful? Yes   Call start time 1403   Call end time 1408   Discharge diagnosis Sepsis associated hypotension , LAPAROTOMY EXPLORATORY, SMALL BOWEL RESECTION, LAPAROTOMY EXPLORATORY RIGHT COLON RESECTION ANASTOMOSIS   Person spoke with today (if not patient) and relationship patient   Meds reviewed with patient/caregiver? Yes   Does the patient have all medications related to this admission filled (includes all antibiotics, inhalers, nebulizers,steroids,etc.) Yes   Is the patient taking all medications as directed (includes completed medication regime)? Yes   Does the patient have a primary care provider?  Yes   Comments regarding PCP Follow up with Brynn Crum, APRN Thursday Dec 8, 2022 930am   Does the patient have an appointment with their PCP within 7 days of discharge? Yes   Has the patient kept scheduled appointments due by today? Yes  [Patient had surgeon appt today. ]   Has home health visited the patient within 72 hours of discharge? N/A   Psychosocial issues? No   Did the patient receive a copy of their discharge instructions? Yes   Nursing interventions Reviewed instructions with patient   What is the patient's perception of their health status since discharge? Improving   Nursing interventions Nurse provided patient education   Is the patient/caregiver able to teach back TIME? T emperature - higher or lower than normal   Nursing interventions Nurse provided reassurance to patient, Nurse provided patient education   Is patient/caregiver able to teach back steps to recovery at home? Set small, achievable goals for return to baseline health, Rest and regain strength   Is the patient/caregiver able to teach back signs and symptoms of worsening condition: Fever   If the patient is a  current smoker, are they able to teach back resources for cessation? Not a smoker   Is the patient/caregiver able to teach back the hierarchy of who to call/visit for symptoms/problems? PCP, Specialist, Home health nurse, Urgent Care, ED, 911 Yes   Week 1 call completed? Yes          GLENNY JEFFREY - Registered Nurse

## 2022-12-06 NOTE — PROGRESS NOTES
"Patient: Kevin Gorline    YOB: 1980    Date: 12/06/2022    Primary Care Provider: Brynn Crum APRN    Vital Signs:   Vitals:    12/06/22 0827   BP: 114/68   BP Location: Left arm   Patient Position: Sitting   Cuff Size: Adult   Pulse: 91   Temp: 98.3 °F (36.8 °C)   Weight: 73.6 kg (162 lb 3.2 oz)   Height: 182.9 cm (72.01\")       He is losing weight as expected.  Bowels moving 3-5 times a day.  Eating well with minimal nausea.  No fevers.  His abdomen is soft nondistended nontender wound looks good.  Lengthy discussion about nutrition.  We will try to set him up with a nutrition consult.  He will start Imodium continue to increase activity.  We will follow-up in 2 weeks.  We discussed possible bile binding salts, tincture of opium potentially etc.  Results Review:   I reviewed the patient's new clinical results.        Assessment / Plan:    Diagnoses and all orders for this visit:    1. Visit for wound check (Primary)              Electronically signed by Yuliya Aranda MD  12/06/22  09:03 CST                  "

## 2022-12-15 ENCOUNTER — READMISSION MANAGEMENT (OUTPATIENT)
Dept: CALL CENTER | Facility: HOSPITAL | Age: 42
End: 2022-12-15

## 2022-12-15 NOTE — OUTREACH NOTE
Sepsis Week 2 Survey    Flowsheet Row Responses   [facility] facility patient discharged from? [place]   Does the patient have one of the following disease processes/diagnoses(primary or secondary)? Sepsis   Week 2 attempt successful? No   Unsuccessful attempts Attempt 1          CHARBEL Ruvalcaba Registered Nurse

## 2022-12-19 ENCOUNTER — READMISSION MANAGEMENT (OUTPATIENT)
Dept: CALL CENTER | Facility: HOSPITAL | Age: 42
End: 2022-12-19

## 2022-12-20 ENCOUNTER — OFFICE VISIT (OUTPATIENT)
Dept: SURGERY | Facility: CLINIC | Age: 42
End: 2022-12-20

## 2022-12-20 VITALS
BODY MASS INDEX: 22.35 KG/M2 | HEIGHT: 72 IN | HEART RATE: 84 BPM | DIASTOLIC BLOOD PRESSURE: 77 MMHG | WEIGHT: 165 LBS | SYSTOLIC BLOOD PRESSURE: 121 MMHG

## 2022-12-20 DIAGNOSIS — Z51.89 VISIT FOR WOUND CHECK: Primary | ICD-10-CM

## 2022-12-20 PROCEDURE — 99024 POSTOP FOLLOW-UP VISIT: CPT | Performed by: SPECIALIST

## 2022-12-20 NOTE — PROGRESS NOTES
"Patient: Kevin Gorline    YOB: 1980    Date: 12/20/2022    Primary Care Provider: Brynn Crum APRN    Vital Signs:   Vitals:    12/20/22 0856   BP: 121/77   BP Location: Left arm   Patient Position: Sitting   Cuff Size: Adult   Pulse: 84   Weight: 74.8 kg (165 lb)   Height: 182.9 cm (72.01\")       Doing well.  Bowels move about twice a day.  No abdominal pain no nausea no vomiting.  Follow-up with me as needed.  Did have a lengthy discussion regarding signs and symptoms of bowel obstruction and the need for quick intervention.  [facility]    Results Review:   I reviewed the patient's new clinical results.        Assessment / Plan:    There are no diagnoses linked to this encounter.          Electronically signed by Yuliya Aranda MD  12/20/22  09:25 CST                  "

## 2022-12-20 NOTE — OUTREACH NOTE
Sepsis Week 2 Survey    Flowsheet Row Responses   [facility] facility patient discharged from? [place]   Does the patient have one of the following disease processes/diagnoses(primary or secondary)? Sepsis   Week 2 attempt successful? No   Unsuccessful attempts Attempt 2          MARY ANN BOWMAN - Licensed Nurse

## 2022-12-28 ENCOUNTER — READMISSION MANAGEMENT (OUTPATIENT)
Dept: CALL CENTER | Facility: HOSPITAL | Age: 42
End: 2022-12-28

## 2022-12-28 NOTE — OUTREACH NOTE
"Sepsis Week 3 Survey    Flowsheet Row Responses   [facility] patient discharged from? [place]   Does the patient have one of the following disease processes/diagnoses(primary or secondary)? Sepsis   Week 3 attempt successful? Yes   Call start time 1207   Call end time 1211   Discharge diagnosis Sepsis associated hypotension , LAPAROTOMY EXPLORATORY, SMALL BOWEL RESECTION, LAPAROTOMY EXPLORATORY RIGHT COLON RESECTION ANASTOMOSIS   Person spoke with today (if not patient) and relationship patient   Meds reviewed with patient/caregiver? Yes   Is the patient having any side effects they believe may be caused by any medication additions or changes? No   Does the patient have all medications related to this admission filled (includes all antibiotics, inhalers, nebulizers,steroids,etc.) Yes   Is the patient taking all medications as directed (includes completed medication regime)? Yes   Does the patient have a primary care provider?  Yes   Comments regarding PCP Completed PCP f/u    Does the patient have an appointment with their PCP within 7 days of discharge? Yes   Has the patient kept scheduled appointments due by today? Yes   Comments Has completed two surgical f/u's   Has home health visited the patient within 72 hours of discharge? N/A   Psychosocial issues? No   Did the patient receive a copy of their discharge instructions? Yes   Nursing interventions Reviewed instructions with patient   What is the patient's perception of their health status since discharge? Improving  [Denies pain, has some occasional nausea but Zofran to use if needed, no issues with elimination.]   Nursing interventions Nurse provided patient education  [Reports he has f/u with surgeon twice and his restrictions mostly lifted except he is unable to \"work out for two weeks.\" Incisions w/o issues]   Is the patient/caregiver able to teach back TIME? T emperature - higher or lower than normal, I nfection - may have signs and symptoms of an " infection, E xtremely Ill - severe pain, discomfort, shortness of breath, M ental Decline - confused, sleepy, difficult to arouse  [reviewed]   Nursing interventions Nurse provided patient education   Is the patient/caregiver able to teach back signs and symptoms of worsening condition: Edema, Fever, Altered mental status(confusion/coma), Rapid heart rate (>90)   If the patient is a current smoker, are they able to teach back resources for cessation? Not a smoker   Is the patient/caregiver able to teach back the hierarchy of who to call/visit for symptoms/problems? PCP, Specialist, Home health nurse, Urgent Care, ED, 911 Yes   Week 3 call completed? Yes          LEDA ROBBINS - Registered Nurse

## 2023-02-28 ENCOUNTER — OFFICE VISIT (OUTPATIENT)
Dept: GASTROENTEROLOGY | Facility: CLINIC | Age: 43
End: 2023-02-28
Payer: OTHER GOVERNMENT

## 2023-02-28 ENCOUNTER — LAB (OUTPATIENT)
Dept: LAB | Facility: HOSPITAL | Age: 43
End: 2023-02-28
Payer: OTHER GOVERNMENT

## 2023-02-28 VITALS
OXYGEN SATURATION: 100 % | SYSTOLIC BLOOD PRESSURE: 120 MMHG | DIASTOLIC BLOOD PRESSURE: 74 MMHG | WEIGHT: 169 LBS | HEIGHT: 72 IN | HEART RATE: 90 BPM | TEMPERATURE: 97 F | BODY MASS INDEX: 22.89 KG/M2

## 2023-02-28 DIAGNOSIS — R19.7 DIARRHEA, UNSPECIFIED TYPE: ICD-10-CM

## 2023-02-28 DIAGNOSIS — R19.8 ALTERED BOWEL FUNCTION: ICD-10-CM

## 2023-02-28 DIAGNOSIS — R19.7 DIARRHEA, UNSPECIFIED TYPE: Primary | ICD-10-CM

## 2023-02-28 LAB — 25(OH)D3 SERPL-MCNC: 24.6 NG/ML (ref 30–100)

## 2023-02-28 PROCEDURE — 84446 ASSAY OF VITAMIN E: CPT

## 2023-02-28 PROCEDURE — 99204 OFFICE O/P NEW MOD 45 MIN: CPT | Performed by: NURSE PRACTITIONER

## 2023-02-28 PROCEDURE — 84590 ASSAY OF VITAMIN A: CPT

## 2023-02-28 PROCEDURE — 36415 COLL VENOUS BLD VENIPUNCTURE: CPT

## 2023-02-28 PROCEDURE — 82306 VITAMIN D 25 HYDROXY: CPT

## 2023-02-28 RX ORDER — POLYETHYLENE GLYCOL 3350 17 G/17G
238 POWDER, FOR SOLUTION ORAL ONCE
Qty: 238 G | Refills: 0 | Status: SHIPPED | OUTPATIENT
Start: 2023-02-28 | End: 2023-02-28

## 2023-03-01 PROBLEM — R19.7 DIARRHEA: Status: ACTIVE | Noted: 2023-03-01

## 2023-03-02 ENCOUNTER — PATIENT ROUNDING (BHMG ONLY) (OUTPATIENT)
Dept: GASTROENTEROLOGY | Facility: CLINIC | Age: 43
End: 2023-03-02
Payer: OTHER GOVERNMENT

## 2023-03-02 NOTE — PROGRESS NOTES
March 2, 2023    Hello, may I speak with Kevin Gorline?    My name is Ruben      I am  with [facility]  [address], [address]  [place] [address] [phone].    Before we get started may I verify your date of birth? 1980    I am calling to officially welcome you to our practice and ask about your recent visit. Is this a good time to talk? yes    Tell me about your visit with us. What things went well?  Everything was fine       We're always looking for ways to make our patients' experiences even better. Do you have recommendations on ways we may improve?  no    Overall were you satisfied with your first visit to our practice? yes       I appreciate you taking the time to speak with me today. Is there anything else I can do for you? no      Thank you, and have a great day.

## 2023-03-03 ENCOUNTER — TELEPHONE (OUTPATIENT)
Dept: GASTROENTEROLOGY | Facility: CLINIC | Age: 43
End: 2023-03-03
Payer: OTHER GOVERNMENT

## 2023-03-03 NOTE — TELEPHONE ENCOUNTER
----- Message from Kevin Gorline sent at 3/3/2023  1:54 PM CST -----  Regarding: BM follow up information   Contact: [phone]  Kb Pulliam,     It was nice meeting you the other day. I really appreciate you going the extra mile and wanting to GATTEX for me, even if all we can get are samples. That really means a lot.     In reality, my bowel movements situation is a lot worse than I told you. I know I should’ve been more transparent, I just feel like it’s gross, and I’m embarrassed about it.    But realistically, most days I’m doing 6-7 BM’s an day. Maybe a few days of the week are good days, and I’m doing only 2-3. Several nights this week I was up most of the night with BM’s, and that usually happens a couple nights a week.     I do really worry that with so many BM’s I’m not getting the nutrition I need, and I can’t seems to gain weight at all lately.     I looked up GATTEX and it seems very promising. So I’ll help however I can if we can try to get it.     It stinks, I was the healthiest, and in the best shape of my life pre surgery, and then it happened randomly. Now I’m just fighting it tooth and nail to stay healthy and get back there.     Thanks so much,   Harsha

## 2023-03-08 LAB
A-TOCOPHEROL VIT E SERPL-MCNC: 5.5 MG/L (ref 7–25.1)
GAMMA TOCOPHEROL SERPL-MCNC: 0.4 MG/L (ref 0.5–5.5)
VIT A SERPL-MCNC: 37.4 UG/DL (ref 20.1–62)

## 2023-03-14 ENCOUNTER — ANESTHESIA (OUTPATIENT)
Dept: GASTROENTEROLOGY | Facility: HOSPITAL | Age: 43
End: 2023-03-14
Payer: OTHER GOVERNMENT

## 2023-03-14 ENCOUNTER — ANESTHESIA EVENT (OUTPATIENT)
Dept: GASTROENTEROLOGY | Facility: HOSPITAL | Age: 43
End: 2023-03-14
Payer: OTHER GOVERNMENT

## 2023-03-14 ENCOUNTER — TELEPHONE (OUTPATIENT)
Dept: GASTROENTEROLOGY | Facility: CLINIC | Age: 43
End: 2023-03-14
Payer: OTHER GOVERNMENT

## 2023-03-14 ENCOUNTER — HOSPITAL ENCOUNTER (OUTPATIENT)
Facility: HOSPITAL | Age: 43
Setting detail: HOSPITAL OUTPATIENT SURGERY
Discharge: HOME | End: 2023-03-14
Attending: INTERNAL MEDICINE | Admitting: INTERNAL MEDICINE
Payer: OTHER GOVERNMENT

## 2023-03-14 VITALS
DIASTOLIC BLOOD PRESSURE: 78 MMHG | BODY MASS INDEX: 22.62 KG/M2 | SYSTOLIC BLOOD PRESSURE: 116 MMHG | HEIGHT: 72 IN | TEMPERATURE: 97.7 F | WEIGHT: 167 LBS | HEART RATE: 83 BPM | OXYGEN SATURATION: 98 % | RESPIRATION RATE: 15 BRPM

## 2023-03-14 DIAGNOSIS — R19.7 DIARRHEA, UNSPECIFIED TYPE: ICD-10-CM

## 2023-03-14 PROCEDURE — 45378 DIAGNOSTIC COLONOSCOPY: CPT | Performed by: INTERNAL MEDICINE

## 2023-03-14 PROCEDURE — 25010000002 PROPOFOL 10 MG/ML EMULSION: Performed by: NURSE ANESTHETIST, CERTIFIED REGISTERED

## 2023-03-14 RX ORDER — LIDOCAINE HYDROCHLORIDE 20 MG/ML
INJECTION, SOLUTION EPIDURAL; INFILTRATION; INTRACAUDAL; PERINEURAL AS NEEDED
Status: DISCONTINUED | OUTPATIENT
Start: 2023-03-14 | End: 2023-03-14 | Stop reason: SURG

## 2023-03-14 RX ORDER — SODIUM CHLORIDE 0.9 % (FLUSH) 0.9 %
10 SYRINGE (ML) INJECTION AS NEEDED
Status: DISCONTINUED | OUTPATIENT
Start: 2023-03-14 | End: 2023-03-14 | Stop reason: HOSPADM

## 2023-03-14 RX ORDER — PROPOFOL 10 MG/ML
VIAL (ML) INTRAVENOUS AS NEEDED
Status: DISCONTINUED | OUTPATIENT
Start: 2023-03-14 | End: 2023-03-14 | Stop reason: SURG

## 2023-03-14 RX ORDER — SODIUM CHLORIDE 9 MG/ML
500 INJECTION, SOLUTION INTRAVENOUS CONTINUOUS PRN
Status: DISCONTINUED | OUTPATIENT
Start: 2023-03-14 | End: 2023-03-14 | Stop reason: HOSPADM

## 2023-03-14 RX ADMIN — PROPOFOL INJECTABLE EMULSION 250 MG: 10 INJECTION, EMULSION INTRAVENOUS at 11:05

## 2023-03-14 RX ADMIN — SODIUM CHLORIDE 500 ML: 9 INJECTION, SOLUTION INTRAVENOUS at 10:52

## 2023-03-14 RX ADMIN — LIDOCAINE HYDROCHLORIDE 100 MG: 20 INJECTION, SOLUTION EPIDURAL; INFILTRATION; INTRACAUDAL; PERINEURAL at 11:05

## 2023-03-14 NOTE — ANESTHESIA PREPROCEDURE EVALUATION
Anesthesia Evaluation     Patient summary reviewed and Nursing notes reviewed   no history of anesthetic complications:  NPO Solid Status: > 8 hours  NPO Liquid Status: > 2 hours           Airway   Mallampati: II  TM distance: >3 FB  Neck ROM: full  No difficulty expected  Dental - normal exam     Pulmonary - normal exam   (+) a smoker Former,     ROS comment: intubated  Cardiovascular - negative cardio ROS  Exercise tolerance: excellent (>7 METS)    Rate: abnormal        Neuro/Psych  (+) psychiatric history ADHD,    GI/Hepatic/Renal/Endo    (+)   renal disease,     ROS Comment: 11/2022  Admitted with sepsis, found to have bowel obstruction  Required bowel resection, had arf  All resolved  Presents for colonoscopy    Musculoskeletal (-) negative ROS    Abdominal  - normal exam   Substance History      OB/GYN negative ob/gyn ROS         Other - negative ROS                       Anesthesia Plan    ASA 2     MAC     intravenous induction     Anesthetic plan, risks, benefits, and alternatives have been provided, discussed and informed consent has been obtained with: patient.        CODE STATUS:

## 2023-03-14 NOTE — ANESTHESIA POSTPROCEDURE EVALUATION
"Patient: Kevin Gorline    Procedure Summary     Date: 03/14/23 Room / Location: [facility] ENDOSCOPY 5 / [facility] ENDOSCOPY    Anesthesia Start: 1100 Anesthesia Stop: 1119    Procedure: COLONOSCOPY WITH ANESTHESIA Diagnosis:       Diarrhea, unspecified type      (Diarrhea, unspecified type [R19.7])    Surgeons: Carlos Bradshaw DO Provider: Kylah Monaco CRNA    Anesthesia Type: MAC ASA Status: 2          Anesthesia Type: MAC    Vitals  Vitals Value Taken Time   /79 03/14/23 1131   Temp     Pulse 73 03/14/23 1133   Resp 15 03/14/23 1120   SpO2 100 % 03/14/23 1133   Vitals shown include unvalidated device data.        Post Anesthesia Care and Evaluation    Patient location during evaluation: PHASE II  Patient participation: complete - patient participated  Level of consciousness: awake and awake and alert  Pain score: 0  Pain management: adequate    Airway patency: patent  Anesthetic complications: No anesthetic complications  PONV Status: none  Cardiovascular status: acceptable  Respiratory status: acceptable  Hydration status: acceptable    Comments: Patient discharged according to acceptable Jyoti score per RN assessment. See nursing records for further information.     Blood pressure 117/80, pulse 83, temperature 97.7 °F (36.5 °C), temperature source Temporal, resp. rate 15, height 182.9 cm (72\"), weight 75.8 kg (167 lb), SpO2 98 %.        "

## 2023-03-14 NOTE — TELEPHONE ENCOUNTER
He was not cleaned out enough today  Can u touch base with him and have him return in 2-3 wks after a 2 day prep?  ty

## 2023-03-21 NOTE — TELEPHONE ENCOUNTER
Left message   Pt toleratedFlu injection well. Instructed to wait in the clinic for 15 minutes and report any adverse effects immediately to the nurse. Verbalized understanding.

## 2023-04-10 DIAGNOSIS — R19.8 ALTERED BOWEL FUNCTION: Primary | ICD-10-CM

## 2023-05-08 ENCOUNTER — HOSPITAL ENCOUNTER (OUTPATIENT)
Facility: HOSPITAL | Age: 43
Setting detail: HOSPITAL OUTPATIENT SURGERY
Discharge: HOME | End: 2023-05-08
Attending: INTERNAL MEDICINE | Admitting: INTERNAL MEDICINE
Payer: OTHER GOVERNMENT

## 2023-05-08 ENCOUNTER — ANESTHESIA (OUTPATIENT)
Dept: GASTROENTEROLOGY | Facility: HOSPITAL | Age: 43
End: 2023-05-08
Payer: OTHER GOVERNMENT

## 2023-05-08 ENCOUNTER — ANESTHESIA EVENT (OUTPATIENT)
Dept: GASTROENTEROLOGY | Facility: HOSPITAL | Age: 43
End: 2023-05-08
Payer: OTHER GOVERNMENT

## 2023-05-08 VITALS
BODY MASS INDEX: 23.3 KG/M2 | HEIGHT: 72 IN | WEIGHT: 172 LBS | HEART RATE: 79 BPM | TEMPERATURE: 98.1 F | DIASTOLIC BLOOD PRESSURE: 82 MMHG | OXYGEN SATURATION: 99 % | RESPIRATION RATE: 18 BRPM | SYSTOLIC BLOOD PRESSURE: 115 MMHG

## 2023-05-08 DIAGNOSIS — R19.8 ALTERED BOWEL FUNCTION: ICD-10-CM

## 2023-05-08 PROCEDURE — 25010000002 PROPOFOL 10 MG/ML EMULSION: Performed by: NURSE ANESTHETIST, CERTIFIED REGISTERED

## 2023-05-08 PROCEDURE — 88305 TISSUE EXAM BY PATHOLOGIST: CPT | Performed by: INTERNAL MEDICINE

## 2023-05-08 PROCEDURE — 45378 DIAGNOSTIC COLONOSCOPY: CPT | Performed by: INTERNAL MEDICINE

## 2023-05-08 RX ORDER — PROPOFOL 10 MG/ML
VIAL (ML) INTRAVENOUS AS NEEDED
Status: DISCONTINUED | OUTPATIENT
Start: 2023-05-08 | End: 2023-05-08 | Stop reason: SURG

## 2023-05-08 RX ORDER — SODIUM CHLORIDE 9 MG/ML
500 INJECTION, SOLUTION INTRAVENOUS CONTINUOUS PRN
Status: DISCONTINUED | OUTPATIENT
Start: 2023-05-08 | End: 2023-05-08 | Stop reason: HOSPADM

## 2023-05-08 RX ORDER — LIDOCAINE HYDROCHLORIDE 10 MG/ML
0.5 INJECTION, SOLUTION EPIDURAL; INFILTRATION; INTRACAUDAL; PERINEURAL ONCE AS NEEDED
Status: DISCONTINUED | OUTPATIENT
Start: 2023-05-08 | End: 2023-05-08 | Stop reason: HOSPADM

## 2023-05-08 RX ORDER — SODIUM CHLORIDE 0.9 % (FLUSH) 0.9 %
10 SYRINGE (ML) INJECTION AS NEEDED
Status: DISCONTINUED | OUTPATIENT
Start: 2023-05-08 | End: 2023-05-08 | Stop reason: HOSPADM

## 2023-05-08 RX ORDER — LIDOCAINE HYDROCHLORIDE 20 MG/ML
INJECTION, SOLUTION EPIDURAL; INFILTRATION; INTRACAUDAL; PERINEURAL AS NEEDED
Status: DISCONTINUED | OUTPATIENT
Start: 2023-05-08 | End: 2023-05-08 | Stop reason: SURG

## 2023-05-08 RX ADMIN — LIDOCAINE HYDROCHLORIDE 50 MG: 20 INJECTION, SOLUTION EPIDURAL; INFILTRATION; INTRACAUDAL; PERINEURAL at 09:29

## 2023-05-08 RX ADMIN — SODIUM CHLORIDE 500 ML: 9 INJECTION, SOLUTION INTRAVENOUS at 08:22

## 2023-05-08 RX ADMIN — PROPOFOL INJECTABLE EMULSION 400 MG: 10 INJECTION, EMULSION INTRAVENOUS at 09:29

## 2023-05-08 NOTE — H&P
[facility] Gastroenterology  Pre Procedure History & Physical    Chief Complaint:   Diarrhea    Subjective     HPI:   Diarrhea    Past Medical History:   Past Medical History:   Diagnosis Date   • ADHD        Past Surgical History:  Past Surgical History:   Procedure Laterality Date   • CLAVICLE SURGERY Left    • COLONOSCOPY     • COLONOSCOPY N/A 3/14/2023    Procedure: COLONOSCOPY WITH ANESTHESIA;  Surgeon: Carlos Bradshaw DO;  Location: [facility] ENDOSCOPY;  Service: Gastroenterology;  Laterality: N/A;  pre diarrhea  post diarrhea  Brynn Crum   • EXPLORATORY LAPAROTOMY N/A 11/24/2022    Procedure: LAPAROTOMY EXPLORATORY, SMALL BOWEL RESECTION;  Surgeon: Yuliya Aranda MD;  Location: [facility] OR;  Service: General;  Laterality: N/A;   • EXPLORATORY LAPAROTOMY N/A 11/25/2022    Procedure: LAPAROTOMY EXPLORATORY RIGHT COLON RESECTION ANASTOMOSIS;  Surgeon: Yuliya Aranda MD;  Location: [facility] OR;  Service: General;  Laterality: N/A;   • WRIST FRACTURE SURGERY Right        Family History:  Family History   Problem Relation Age of Onset   • Peripheral vascular disease Mother    • Diabetes Father    • Diabetes Brother    • Colon cancer Neg Hx    • Colon polyps Neg Hx    • Esophageal cancer Neg Hx        Social History:   reports that he has quit smoking. His smoking use included cigarettes. He has quit using smokeless tobacco. He reports current alcohol use. He reports that he does not use drugs.    Medications:   Prior to Admission medications    Medication Sig Start Date End Date Taking? Authorizing Provider   acetaminophen (TYLENOL) 325 MG tablet Take 2 tablets by mouth Every 4 (Four) Hours As Needed for Mild Pain or Moderate Pain. 12/1/22  Yes Tulio Silva MD   amphetamine-dextroamphetamine (ADDERALL) 20 MG tablet Take 1 tablet by mouth Daily.    Provider, MD Britni   anastrozole (ARIMIDEX) 1 MG tablet Take  by mouth Daily.    ProviderBritni MD   HYDROcodone-acetaminophen (NORCO) 7.5-325  "MG per tablet Take 1 tablet by mouth Every 4 (Four) Hours As Needed for Moderate Pain for up to 30 doses. 12/1/22 5/8/23  Yuliya Aranda MD   ondansetron (ZOFRAN) 4 MG tablet Take 1 tablet by mouth Every 6 (Six) Hours As Needed for Nausea or Vomiting. 12/1/22 5/8/23  Tulio Silva MD       Allergies:  Sulfa antibiotics    ROS:    General: Weight stable  Resp: No SOA  Cardiovascular: No CP    Objective     Blood pressure 121/83, pulse 91, temperature 98.1 °F (36.7 °C), temperature source Temporal, resp. rate 18, height 182.9 cm (72\"), weight 78 kg (172 lb), SpO2 97 %.    Physical Exam   Constitutional: Pt is oriented to person, place, and in no distress.   HENT: Mouth/Throat: Oropharynx is clear.   Cardiovascular: Normal rate, regular rhythm.    Pulmonary/Chest: Effort normal. No respiratory distress. No  wheezes.   Abdominal: Soft. Non-distended.  Skin: Skin is warm and dry.   Psychiatric: Mood, memory, affect and judgment appear normal.     Assessment & Plan     Diagnosis:  Diarrhea    Anticipated Surgical Procedure:  C-scope    The risks, benefits, and alternatives of this procedure have been discussed with the patient or the responsible party- the patient understands and agrees to proceed.        "

## 2023-05-08 NOTE — ANESTHESIA PREPROCEDURE EVALUATION
Anesthesia Evaluation     Patient summary reviewed and Nursing notes reviewed   no history of anesthetic complications:  NPO Solid Status: > 8 hours             Airway   Mallampati: II  TM distance: >3 FB  Neck ROM: full  No difficulty expected  Dental - normal exam     Pulmonary    (+) a smoker Former,     ROS comment: intubated  Cardiovascular - negative cardio ROS  Exercise tolerance: excellent (>7 METS)        Neuro/Psych  (+) psychiatric history ADHD,    GI/Hepatic/Renal/Endo    (+)   renal disease,     ROS Comment: 11/2022  Admitted with sepsis, found to have bowel obstruction  Required bowel resection, had arf  All resolved  Presents for colonoscopy    Musculoskeletal     Abdominal    Substance History      OB/GYN          Other                          Anesthesia Plan    ASA 2     MAC     intravenous induction     Anesthetic plan, risks, benefits, and alternatives have been provided, discussed and informed consent has been obtained with: patient.        CODE STATUS:

## 2023-05-11 ENCOUNTER — TELEPHONE (OUTPATIENT)
Dept: GASTROENTEROLOGY | Facility: CLINIC | Age: 43
End: 2023-05-11
Payer: OTHER GOVERNMENT

## 2023-05-11 NOTE — TELEPHONE ENCOUNTER
----- Message from Carlos Bradshaw DO sent at 5/11/2023  7:39 AM CDT -----  I would like to talk with him during pm office /BREAK     ----- Message -----  From: Lab, Background User  Sent: 5/9/2023   1:47 PM CDT  To: Carlos Bradshaw DO

## 2023-05-15 ENCOUNTER — TELEPHONE (OUTPATIENT)
Dept: GASTROENTEROLOGY | Facility: CLINIC | Age: 43
End: 2023-05-15
Payer: OTHER GOVERNMENT

## 2023-05-15 NOTE — TELEPHONE ENCOUNTER
Talked to him just now  Told him his colon bx  [facility] which [facility]  Questran or colestid  Once we know this can u message me back  ty

## 2024-02-16 ENCOUNTER — TELEPHONE (OUTPATIENT)
Dept: GASTROENTEROLOGY | Facility: CLINIC | Age: 44
End: 2024-02-16
Payer: OTHER GOVERNMENT

## 2025-04-03 NOTE — ANESTHESIA POSTPROCEDURE EVALUATION
I have personally reviewed the OARRS report. This report is scanned into the electronic medical record. I have considered the risks of abuse, dependence, addiction and diversion. I believe that it is clinically appropriate to be prescribed this medication. Also, I believe that it is clinically appropriate for this patient to be prescribed this medication. Based on the patient's condition and response to current treatment regimen, I do not feel that it is clinically necessary for this patient to be seen in the office every 90 days.      Patient: Kevin Gorline    Procedure Summary       Date: 05/08/23 Room / Location: [facility] ENDOSCOPY 4 / [facility] ENDOSCOPY    Anesthesia Start: 0927 Anesthesia Stop: 0949    Procedure: COLONOSCOPY WITH ANESTHESIA Diagnosis:       Altered bowel function      (Altered bowel function [R19.8])    Surgeons: Carlos Bradshaw DO Provider: Alli Hoover CRNA    Anesthesia Type: MAC ASA Status: 2            Anesthesia Type: MAC    Vitals  Vitals Value Taken Time   /82 05/08/23 1006   Temp     Pulse 71 05/08/23 1007   Resp 18 05/08/23 1005   SpO2 100 % 05/08/23 1007   Vitals shown include unvalidated device data.        Post Anesthesia Care and Evaluation    Patient location during evaluation: PHASE II  Patient participation: complete - patient participated  Level of consciousness: awake  Pain management: adequate    Airway patency: patent  Anesthetic complications: No anesthetic complications  Respiratory status: acceptable  Hydration status: acceptable

## (undated) DEVICE — NDL CNTR 40CT FM MAG: Brand: MEDLINE INDUSTRIES, INC.

## (undated) DEVICE — FRCP BX RADJAW4 NDL 2.8 240 STD OG

## (undated) DEVICE — YANKAUER,BULB TIP WITH VENT: Brand: ARGYLE

## (undated) DEVICE — KT DRP SPY/PHI W/ICG 25MG STRL

## (undated) DEVICE — ANTIBACTERIAL UNDYED BRAIDED (POLYGLACTIN 910), SYNTHETIC ABSORBABLE SUTURE: Brand: COATED VICRYL

## (undated) DEVICE — APPL CHLORAPREP HI/LITE 26ML ORNG

## (undated) DEVICE — TRAP FLD MINIVAC MEGADYNE 100ML

## (undated) DEVICE — SENSR O2 OXIMAX FNGR A/ 18IN NONSTR

## (undated) DEVICE — BNDR ABD 4PANEL 12IN 46 TO 62IN

## (undated) DEVICE — THE CHANNEL CLEANING BRUSH IS A NYLON FLEXI BRUSH ATTACHED TO A FLEXIBLE PLASTIC SHEATH DESIGNED TO SAFELY REMOVE DEBRIS FROM FLEXIBLE ENDOSCOPES.

## (undated) DEVICE — PAD MAJOR: Brand: MEDLINE INDUSTRIES, INC.

## (undated) DEVICE — MASK,OXYGEN,MED CONC,ADLT,7' TUB, UC: Brand: PENDING

## (undated) DEVICE — CLTH CLENS READYCLEANSE PERI CARE PK/5

## (undated) DEVICE — SUT SILK 3/0 SUTUPAK TIES 24IN SA74H

## (undated) DEVICE — SUT SILK 2/0 SH CR8 18IN CR8 C012D

## (undated) DEVICE — SUT SILK 2/0 SUTUPAK TIES 24IN SA75H

## (undated) DEVICE — DRSNG SURESITE123 4X10IN

## (undated) DEVICE — Device: Brand: DEFENDO AIR/WATER/SUCTION AND BIOPSY VALVE

## (undated) DEVICE — ENSEAL 20 CM SHAFT, LARGE JAW: Brand: ENSEAL X1

## (undated) DEVICE — GLV SURG BIOGEL M LTX PF 7 1/2

## (undated) DEVICE — DRP CASSET 10 RAY LG 24X40

## (undated) DEVICE — BARRIER, ABSORBABLE, ADHESION: Brand: SEPRAFILM®

## (undated) DEVICE — 4-PORT MANIFOLD: Brand: NEPTUNE 2

## (undated) DEVICE — SUT PDS 1 CTX 36IN VIO PDP371T

## (undated) DEVICE — BAPTIST TURNOVER KIT: Brand: MEDLINE INDUSTRIES, INC.